# Patient Record
Sex: MALE | Race: BLACK OR AFRICAN AMERICAN | Employment: UNEMPLOYED | URBAN - METROPOLITAN AREA
[De-identification: names, ages, dates, MRNs, and addresses within clinical notes are randomized per-mention and may not be internally consistent; named-entity substitution may affect disease eponyms.]

---

## 2018-11-14 ENCOUNTER — OFFICE VISIT (OUTPATIENT)
Dept: FAMILY MEDICINE CLINIC | Age: 48
End: 2018-11-14
Payer: MEDICARE

## 2018-11-14 VITALS
BODY MASS INDEX: 23.32 KG/M2 | HEIGHT: 67 IN | WEIGHT: 148.6 LBS | SYSTOLIC BLOOD PRESSURE: 135 MMHG | HEART RATE: 76 BPM | DIASTOLIC BLOOD PRESSURE: 74 MMHG | OXYGEN SATURATION: 100 %

## 2018-11-14 DIAGNOSIS — Z13.29 SCREENING FOR THYROID DISORDER: ICD-10-CM

## 2018-11-14 DIAGNOSIS — F71 MR (MENTAL RETARDATION), MODERATE: ICD-10-CM

## 2018-11-14 DIAGNOSIS — R73.9 HYPERGLYCEMIA: ICD-10-CM

## 2018-11-14 DIAGNOSIS — Z23 NEED FOR INFLUENZA VACCINATION: Primary | ICD-10-CM

## 2018-11-14 DIAGNOSIS — Z12.5 SCREENING FOR PROSTATE CANCER: ICD-10-CM

## 2018-11-14 DIAGNOSIS — F63.9 IMPULSE CONTROL DISORDER: ICD-10-CM

## 2018-11-14 DIAGNOSIS — E55.9 VITAMIN D DEFICIENCY: ICD-10-CM

## 2018-11-14 DIAGNOSIS — R01.2 ABNORMAL HEART SOUNDS: ICD-10-CM

## 2018-11-14 DIAGNOSIS — Z13.220 SCREENING, LIPID: ICD-10-CM

## 2018-11-14 DIAGNOSIS — E53.9 VITAMIN B DEFICIENCY: ICD-10-CM

## 2018-11-14 DIAGNOSIS — F95.2 TOURETTE'S SYNDROME: ICD-10-CM

## 2018-11-14 PROCEDURE — 90686 IIV4 VACC NO PRSV 0.5 ML IM: CPT | Performed by: FAMILY MEDICINE

## 2018-11-14 PROCEDURE — 99214 OFFICE O/P EST MOD 30 MIN: CPT | Performed by: FAMILY MEDICINE

## 2018-11-14 PROCEDURE — 1036F TOBACCO NON-USER: CPT | Performed by: FAMILY MEDICINE

## 2018-11-14 PROCEDURE — G8482 FLU IMMUNIZE ORDER/ADMIN: HCPCS | Performed by: FAMILY MEDICINE

## 2018-11-14 PROCEDURE — G0008 ADMIN INFLUENZA VIRUS VAC: HCPCS | Performed by: FAMILY MEDICINE

## 2018-11-14 PROCEDURE — G8420 CALC BMI NORM PARAMETERS: HCPCS | Performed by: FAMILY MEDICINE

## 2018-11-14 PROCEDURE — G8427 DOCREV CUR MEDS BY ELIG CLIN: HCPCS | Performed by: FAMILY MEDICINE

## 2018-11-14 ASSESSMENT — PATIENT HEALTH QUESTIONNAIRE - PHQ9
SUM OF ALL RESPONSES TO PHQ QUESTIONS 1-9: 0
SUM OF ALL RESPONSES TO PHQ QUESTIONS 1-9: 0
2. FEELING DOWN, DEPRESSED OR HOPELESS: 0
1. LITTLE INTEREST OR PLEASURE IN DOING THINGS: 0
SUM OF ALL RESPONSES TO PHQ9 QUESTIONS 1 & 2: 0

## 2018-11-14 ASSESSMENT — ENCOUNTER SYMPTOMS
SORE THROAT: 0
EYE DISCHARGE: 0
EYE PAIN: 0
SHORTNESS OF BREATH: 0
CONSTIPATION: 0
ABDOMINAL PAIN: 0
VOMITING: 0
NAUSEA: 0
VOICE CHANGE: 0
CHEST TIGHTNESS: 0
BACK PAIN: 0
COLOR CHANGE: 0
ABDOMINAL DISTENTION: 0
SINUS PRESSURE: 0
DIARRHEA: 0
RHINORRHEA: 0

## 2018-11-14 NOTE — PROGRESS NOTES
place, and time. He appears well-developed and well-nourished. HENT:   Head: Normocephalic and atraumatic. Right Ear: External ear normal.   Left Ear: External ear normal.   Nose: Nose normal.   Mouth/Throat: Oropharynx is clear and moist. No oropharyngeal exudate. Eyes: Pupils are equal, round, and reactive to light. EOM are normal. Right eye exhibits no discharge. Left eye exhibits no discharge. No scleral icterus. Neck: Normal range of motion. Neck supple. No JVD present. No tracheal deviation present. No thyromegaly present. Cardiovascular: Normal rate, regular rhythm and intact distal pulses. Exam reveals no gallop and no friction rub. Murmur heard. Pulmonary/Chest: No stridor. No respiratory distress. He has no wheezes. He has no rales. He exhibits no tenderness. Abdominal: Soft. Bowel sounds are normal. He exhibits no distension and no mass. There is no tenderness. There is no rebound and no guarding. Musculoskeletal: He exhibits no edema or tenderness. Lymphadenopathy:     He has no cervical adenopathy. Neurological: He is alert and oriented to person, place, and time. He has normal reflexes. He displays normal reflexes. No cranial nerve deficit. He exhibits normal muscle tone. Coordination normal.   MR noted. Has tics and clicks when he speaks   Skin: Skin is warm and dry. No rash noted. No erythema. No pallor. Psychiatric: He has a normal mood and affect. His behavior is normal. Judgment and thought content normal.       Assessment:       Diagnosis Orders   1. Need for influenza vaccination  INFLUENZA, QUADV, 3 YRS AND OLDER, IM, PF, PREFILL SYR OR SDV, 0.5ML (FLUZONE QUADV, PF)   2. MR (mental retardation), moderate     3. Impulse control disorder     4. Tourette's syndrome  Willie Gonzalez MD, Neurology [facility]*   5. Screening, lipid  Comprehensive Metabolic Panel    Lipid Panel   6. Screening for thyroid disorder  T3, Free    T4, Free    TSH without Reflex   7.  Vitamin D deficiency  Vitamin D 25 Hydroxy   8. Vitamin B deficiency  CBC    Folate    Vitamin B12   9. Screening for prostate cancer  Psa screening   10. Hyperglycemia  Hemoglobin A1C         Plan:      Orders Placed This Encounter   Procedures    INFLUENZA, QUADV, 3 YRS AND OLDER, IM, PF, PREFILL SYR OR SDV, 0.5ML (FLUZONE QUADV, PF)    CBC    Comprehensive Metabolic Panel    Folate    Hemoglobin A1C    Lipid Panel    Psa screening    T3, Free    T4, Free    TSH without Reflex    Vitamin B12    Vitamin D 25 Hydroxy   Wally Sandra MD, Neurology [place]*       No outpatient encounter prescriptions on file as of 11/14/2018. No facility-administered encounter medications on file as of 11/14/2018.             Carlene Grider MD

## 2018-11-24 LAB
ALBUMIN SERPL-MCNC: NORMAL G/DL
ALP BLD-CCNC: NORMAL U/L
ALT SERPL-CCNC: NORMAL U/L
ANION GAP SERPL CALCULATED.3IONS-SCNC: NORMAL MMOL/L
AST SERPL-CCNC: NORMAL U/L
AVERAGE GLUCOSE: NORMAL
BASOPHILS ABSOLUTE: NORMAL /ΜL
BASOPHILS RELATIVE PERCENT: NORMAL %
BILIRUB SERPL-MCNC: NORMAL MG/DL (ref 0.1–1.4)
BUN BLDV-MCNC: NORMAL MG/DL
CALCIUM SERPL-MCNC: NORMAL MG/DL
CHLORIDE BLD-SCNC: NORMAL MMOL/L
CHOLESTEROL, TOTAL: 186 MG/DL
CHOLESTEROL/HDL RATIO: 2.4
CO2: NORMAL MMOL/L
CREAT SERPL-MCNC: 0.87 MG/DL
EOSINOPHILS ABSOLUTE: NORMAL /ΜL
EOSINOPHILS RELATIVE PERCENT: NORMAL %
GFR CALCULATED: NORMAL
GLUCOSE BLD-MCNC: NORMAL MG/DL
HBA1C MFR BLD: 5.5 %
HCT VFR BLD CALC: NORMAL % (ref 41–53)
HDLC SERPL-MCNC: 79 MG/DL (ref 35–70)
HEMOGLOBIN: NORMAL G/DL (ref 13.5–17.5)
LDL CHOLESTEROL CALCULATED: 94 MG/DL (ref 0–160)
LYMPHOCYTES ABSOLUTE: NORMAL /ΜL
LYMPHOCYTES RELATIVE PERCENT: NORMAL %
MCH RBC QN AUTO: NORMAL PG
MCHC RBC AUTO-ENTMCNC: NORMAL G/DL
MCV RBC AUTO: NORMAL FL
MONOCYTES ABSOLUTE: NORMAL /ΜL
MONOCYTES RELATIVE PERCENT: NORMAL %
NEUTROPHILS ABSOLUTE: NORMAL /ΜL
NEUTROPHILS RELATIVE PERCENT: NORMAL %
PLATELET # BLD: NORMAL K/ΜL
PMV BLD AUTO: NORMAL FL
POTASSIUM SERPL-SCNC: 4.2 MMOL/L
PROSTATE SPECIFIC ANTIGEN: NORMAL NG/ML
RBC # BLD: NORMAL 10^6/ΜL
SODIUM BLD-SCNC: NORMAL MMOL/L
T3 FREE: NORMAL
T4 FREE: NORMAL
TOTAL PROTEIN: NORMAL
TRIGL SERPL-MCNC: 44 MG/DL
TSH SERPL DL<=0.05 MIU/L-ACNC: NORMAL UIU/ML
VITAMIN B-12: NORMAL
VITAMIN D 25-HYDROXY: NORMAL
VITAMIN D2, 25 HYDROXY: NORMAL
VITAMIN D3,25 HYDROXY: NORMAL
VLDLC SERPL CALC-MCNC: ABNORMAL MG/DL
WBC # BLD: NORMAL 10^3/ML

## 2018-11-27 DIAGNOSIS — R73.9 HYPERGLYCEMIA: ICD-10-CM

## 2018-11-27 DIAGNOSIS — Z13.220 SCREENING, LIPID: ICD-10-CM

## 2018-11-27 DIAGNOSIS — E55.9 VITAMIN D DEFICIENCY: ICD-10-CM

## 2018-11-27 DIAGNOSIS — E53.9 VITAMIN B DEFICIENCY: ICD-10-CM

## 2018-11-27 DIAGNOSIS — Z12.5 SCREENING FOR PROSTATE CANCER: ICD-10-CM

## 2018-11-27 DIAGNOSIS — Z13.29 SCREENING FOR THYROID DISORDER: ICD-10-CM

## 2018-11-30 ENCOUNTER — HOSPITAL ENCOUNTER (EMERGENCY)
Age: 48
Discharge: HOME | End: 2018-11-30
Attending: EMERGENCY MEDICINE
Payer: MEDICARE

## 2018-11-30 VITALS
HEART RATE: 73 BPM | BODY MASS INDEX: 23.36 KG/M2 | WEIGHT: 145.38 LBS | TEMPERATURE: 97.5 F | SYSTOLIC BLOOD PRESSURE: 132 MMHG | OXYGEN SATURATION: 98 % | DIASTOLIC BLOOD PRESSURE: 74 MMHG | HEIGHT: 66 IN | RESPIRATION RATE: 18 BRPM

## 2018-11-30 DIAGNOSIS — S76.211A GROIN STRAIN, RIGHT, INITIAL ENCOUNTER: Primary | ICD-10-CM

## 2018-11-30 PROCEDURE — 99282 EMERGENCY DEPT VISIT SF MDM: CPT

## 2018-11-30 RX ORDER — IBUPROFEN 800 MG/1
800 TABLET ORAL EVERY 8 HOURS PRN
Qty: 20 TABLET | Refills: 0 | Status: SHIPPED | OUTPATIENT
Start: 2018-11-30 | End: 2019-06-26 | Stop reason: CLARIF

## 2018-11-30 RX ORDER — IBUPROFEN 800 MG/1
800 TABLET ORAL ONCE
Status: DISCONTINUED | OUTPATIENT
Start: 2018-11-30 | End: 2018-11-30 | Stop reason: HOSPADM

## 2018-11-30 ASSESSMENT — PAIN DESCRIPTION - ORIENTATION: ORIENTATION: RIGHT

## 2018-11-30 ASSESSMENT — PAIN SCALES - GENERAL: PAINLEVEL_OUTOF10: 5

## 2018-11-30 ASSESSMENT — PAIN DESCRIPTION - LOCATION: LOCATION: HIP

## 2018-11-30 ASSESSMENT — PAIN SCALES - WONG BAKER: WONGBAKER_NUMERICALRESPONSE: 4

## 2018-11-30 NOTE — ED PROVIDER NOTES
[address] [place] [address] ED  eMERGENCY dEPARTMENT eNCOUnter      Pt Name: Donald Rutherford  MRN: [id number]  [place] 1970  Date of evaluation: 11/30/2018  Provider: Harsh Brunson       Chief Complaint   Patient presents with    Hip Pain         HISTORY Madelyn  (Location/Symptom, Timing/Onset, Context/Setting, Quality, Duration, Modifying Factors, Severity.)   Donald Rutherford is a 51 y.o. male who presents to the emergency department By private auto for evaluation of right groin pain that happened when he was at work today. He states he was lifting a garbage can up and felt a pull in his right groin area. Pain is a 5 out of 10 and is aggravated with walking. Nursing Notes were reviewed. PASTMEDICAL HISTORY     Past Medical History:   Diagnosis Date    Heart murmur     Impulse control disorder     MR (mental retardation), moderate     Tourette syndrome          SURGICAL HISTORY     History reviewed. No pertinent surgical history. CURRENT MEDICATIONS     Discharge Medication List as of 11/30/2018  6:11 PM          ALLERGIES     Patient has no known allergies. FAMILY HISTORY       Family History   Problem Relation Age of Onset    Breast Cancer Mother           SOCIAL HISTORY       Social History     Social History    Marital status: Single     Spouse name: N/A    Number of children: N/A    Years of education: N/A     Social History Main Topics    Smoking status: Never Smoker    Smokeless tobacco: Never Used    Alcohol use No    Drug use: No    Sexual activity: No     Other Topics Concern    None     Social History Narrative    None         REVIEW OF SYSTEMS    (2-9 systems for level 4, 10 or more for level 5)     Review of Systems   Musculoskeletal: Positive for myalgias. All other systems reviewed and are negative. Except as noted above the remainder of the review of systems was reviewed and negative.      PHYSICAL EXAM    (up to 7 for level 4, 8 or

## 2018-12-12 ENCOUNTER — OFFICE VISIT (OUTPATIENT)
Dept: FAMILY MEDICINE CLINIC | Age: 48
End: 2018-12-12
Payer: MEDICARE

## 2018-12-12 VITALS
SYSTOLIC BLOOD PRESSURE: 125 MMHG | DIASTOLIC BLOOD PRESSURE: 81 MMHG | BODY MASS INDEX: 24.08 KG/M2 | HEART RATE: 72 BPM | WEIGHT: 149.8 LBS | HEIGHT: 66 IN | OXYGEN SATURATION: 100 %

## 2018-12-12 DIAGNOSIS — F95.8 MOTOR TIC DISORDER: ICD-10-CM

## 2018-12-12 DIAGNOSIS — F95.2 TOURETTE'S SYNDROME: ICD-10-CM

## 2018-12-12 DIAGNOSIS — E55.9 VITAMIN D DEFICIENCY: ICD-10-CM

## 2018-12-12 DIAGNOSIS — Z00.00 ROUTINE GENERAL MEDICAL EXAMINATION AT A HEALTH CARE FACILITY: Primary | ICD-10-CM

## 2018-12-12 PROCEDURE — G8482 FLU IMMUNIZE ORDER/ADMIN: HCPCS | Performed by: FAMILY MEDICINE

## 2018-12-12 PROCEDURE — G0439 PPPS, SUBSEQ VISIT: HCPCS | Performed by: FAMILY MEDICINE

## 2018-12-12 RX ORDER — ERGOCALCIFEROL 1.25 MG/1
50000 CAPSULE ORAL WEEKLY
Qty: 12 CAPSULE | Refills: 0 | Status: SHIPPED | OUTPATIENT
Start: 2018-12-12 | End: 2019-06-26 | Stop reason: CLARIF

## 2018-12-12 ASSESSMENT — PATIENT HEALTH QUESTIONNAIRE - PHQ9
SUM OF ALL RESPONSES TO PHQ QUESTIONS 1-9: 0
SUM OF ALL RESPONSES TO PHQ QUESTIONS 1-9: 0

## 2018-12-12 ASSESSMENT — ANXIETY QUESTIONNAIRES: GAD7 TOTAL SCORE: 4

## 2018-12-12 ASSESSMENT — LIFESTYLE VARIABLES: HOW OFTEN DO YOU HAVE A DRINK CONTAINING ALCOHOL: 0

## 2019-01-01 ENCOUNTER — TELEPHONE (OUTPATIENT)
Dept: FAMILY MEDICINE CLINIC | Age: 49
End: 2019-01-01

## 2019-01-01 ENCOUNTER — OFFICE VISIT (OUTPATIENT)
Dept: FAMILY MEDICINE CLINIC | Age: 49
End: 2019-01-01
Payer: MEDICARE

## 2019-01-01 ENCOUNTER — OFFICE VISIT (OUTPATIENT)
Dept: NEUROLOGY | Age: 49
End: 2019-01-01
Payer: MEDICARE

## 2019-01-01 ENCOUNTER — HOSPITAL ENCOUNTER (OUTPATIENT)
Dept: MRI IMAGING | Age: 49
Discharge: HOME | End: 2019-11-14
Payer: MEDICARE

## 2019-01-01 VITALS
WEIGHT: 152.2 LBS | SYSTOLIC BLOOD PRESSURE: 108 MMHG | BODY MASS INDEX: 23.89 KG/M2 | DIASTOLIC BLOOD PRESSURE: 66 MMHG | OXYGEN SATURATION: 98 % | HEART RATE: 66 BPM | HEIGHT: 67 IN

## 2019-01-01 VITALS
HEIGHT: 67 IN | WEIGHT: 149.6 LBS | SYSTOLIC BLOOD PRESSURE: 108 MMHG | HEART RATE: 73 BPM | DIASTOLIC BLOOD PRESSURE: 71 MMHG | OXYGEN SATURATION: 99 % | BODY MASS INDEX: 23.48 KG/M2

## 2019-01-01 VITALS
WEIGHT: 149.6 LBS | HEIGHT: 67 IN | DIASTOLIC BLOOD PRESSURE: 71 MMHG | HEART RATE: 72 BPM | SYSTOLIC BLOOD PRESSURE: 117 MMHG | BODY MASS INDEX: 23.48 KG/M2

## 2019-01-01 DIAGNOSIS — F63.9 IMPULSE CONTROL DISORDER: ICD-10-CM

## 2019-01-01 DIAGNOSIS — Z12.11 SCREEN FOR COLON CANCER: Primary | ICD-10-CM

## 2019-01-01 DIAGNOSIS — Z13.220 SCREENING, LIPID: ICD-10-CM

## 2019-01-01 DIAGNOSIS — Z13.29 SCREENING FOR THYROID DISORDER: ICD-10-CM

## 2019-01-01 DIAGNOSIS — G40.209 PARTIAL SYMPTOMATIC EPILEPSY WITH COMPLEX PARTIAL SEIZURES, NOT INTRACTABLE, WITHOUT STATUS EPILEPTICUS (HCC): Primary | ICD-10-CM

## 2019-01-01 DIAGNOSIS — K40.90 UNILATERAL INGUINAL HERNIA WITHOUT OBSTRUCTION OR GANGRENE, RECURRENCE NOT SPECIFIED: ICD-10-CM

## 2019-01-01 DIAGNOSIS — E53.9 VITAMIN B DEFICIENCY: ICD-10-CM

## 2019-01-01 DIAGNOSIS — E55.9 VITAMIN D DEFICIENCY: ICD-10-CM

## 2019-01-01 DIAGNOSIS — Z12.11 COLON CANCER SCREENING: ICD-10-CM

## 2019-01-01 DIAGNOSIS — G40.209 PARTIAL SYMPTOMATIC EPILEPSY WITH COMPLEX PARTIAL SEIZURES, NOT INTRACTABLE, WITHOUT STATUS EPILEPTICUS (HCC): ICD-10-CM

## 2019-01-01 DIAGNOSIS — Z23 NEED FOR INFLUENZA VACCINATION: Primary | ICD-10-CM

## 2019-01-01 DIAGNOSIS — R73.9 HYPERGLYCEMIA: ICD-10-CM

## 2019-01-01 DIAGNOSIS — G93.81 MESIAL TEMPORAL SCLEROSIS: ICD-10-CM

## 2019-01-01 DIAGNOSIS — Z13.1 SCREENING FOR DIABETES MELLITUS: ICD-10-CM

## 2019-01-01 DIAGNOSIS — Z12.5 SCREENING FOR MALIGNANT NEOPLASM OF PROSTATE: ICD-10-CM

## 2019-01-01 DIAGNOSIS — Z00.00 ROUTINE GENERAL MEDICAL EXAMINATION AT A HEALTH CARE FACILITY: ICD-10-CM

## 2019-01-01 DIAGNOSIS — F71 MODERATE INTELLECTUAL DISABILITY: ICD-10-CM

## 2019-01-01 DIAGNOSIS — F95.2 TOURETTE'S SYNDROME: ICD-10-CM

## 2019-01-01 DIAGNOSIS — K40.90 NON-RECURRENT UNILATERAL INGUINAL HERNIA WITHOUT OBSTRUCTION OR GANGRENE: ICD-10-CM

## 2019-01-01 DIAGNOSIS — F95.2 TOURETTE SYNDROME: ICD-10-CM

## 2019-01-01 LAB
ALBUMIN SERPL-MCNC: 4.2 G/DL
ALP BLD-CCNC: 91 U/L
ALT SERPL-CCNC: 20 U/L
ANION GAP SERPL CALCULATED.3IONS-SCNC: NORMAL MMOL/L
AST SERPL-CCNC: 24 U/L
AVERAGE GLUCOSE: 111
BASOPHILS ABSOLUTE: 0.1 /ΜL
BASOPHILS RELATIVE PERCENT: 0.7 %
BILIRUB SERPL-MCNC: 0.6 MG/DL (ref 0.1–1.4)
BUN BLDV-MCNC: NORMAL MG/DL
CALCIUM SERPL-MCNC: 9.7 MG/DL
CHLORIDE BLD-SCNC: 107 MMOL/L
CHOLESTEROL, TOTAL: 163 MG/DL
CHOLESTEROL/HDL RATIO: 2.5
CO2: NORMAL
CREAT SERPL-MCNC: 1.04 MG/DL
EOSINOPHILS ABSOLUTE: 0.1 /ΜL
EOSINOPHILS RELATIVE PERCENT: 0.9 %
GFR CALCULATED: <60
GLUCOSE BLD-MCNC: 90 MG/DL
HBA1C MFR BLD: 5.5 %
HCT VFR BLD CALC: 46.7 % (ref 41–53)
HDLC SERPL-MCNC: 64 MG/DL (ref 35–70)
HEMOGLOBIN: 15.5 G/DL (ref 13.5–17.5)
LDL CHOLESTEROL CALCULATED: 89 MG/DL (ref 0–160)
LYMPHOCYTES ABSOLUTE: 1.8 /ΜL
LYMPHOCYTES RELATIVE PERCENT: 22.7 %
MCH RBC QN AUTO: NORMAL PG
MCHC RBC AUTO-ENTMCNC: NORMAL G/DL
MCV RBC AUTO: NORMAL FL
MONOCYTES ABSOLUTE: 0.6 /ΜL
MONOCYTES RELATIVE PERCENT: 7.4 %
NEUTROPHILS ABSOLUTE: 5.3 /ΜL
NEUTROPHILS RELATIVE PERCENT: 68.3 %
PLATELET # BLD: 195 K/ΜL
PMV BLD AUTO: NORMAL FL
POTASSIUM SERPL-SCNC: 4.6 MMOL/L
PROSTATE SPECIFIC ANTIGEN: 1.81 NG/ML
RBC # BLD: 5.47 10^6/ΜL
SODIUM BLD-SCNC: 144 MMOL/L
T3 FREE: NORMAL
T4 FREE: NORMAL
TOTAL PROTEIN: 8.2
TRIGL SERPL-MCNC: 50 MG/DL
TSH SERPL DL<=0.05 MIU/L-ACNC: 0.52 UIU/ML
VITAMIN B-12: NORMAL
VITAMIN D 25-HYDROXY: NORMAL
VITAMIN D2, 25 HYDROXY: NORMAL
VITAMIN D3,25 HYDROXY: NORMAL
VLDLC SERPL CALC-MCNC: 10 MG/DL
WBC # BLD: 7.8 10^3/ML

## 2019-01-01 PROCEDURE — 1036F TOBACCO NON-USER: CPT | Performed by: NURSE PRACTITIONER

## 2019-01-01 PROCEDURE — G8482 FLU IMMUNIZE ORDER/ADMIN: HCPCS | Performed by: FAMILY MEDICINE

## 2019-01-01 PROCEDURE — G8420 CALC BMI NORM PARAMETERS: HCPCS | Performed by: FAMILY MEDICINE

## 2019-01-01 PROCEDURE — G8427 DOCREV CUR MEDS BY ELIG CLIN: HCPCS | Performed by: FAMILY MEDICINE

## 2019-01-01 PROCEDURE — G8420 CALC BMI NORM PARAMETERS: HCPCS | Performed by: NURSE PRACTITIONER

## 2019-01-01 PROCEDURE — 3017F COLORECTAL CA SCREEN DOC REV: CPT | Performed by: FAMILY MEDICINE

## 2019-01-01 PROCEDURE — 3017F COLORECTAL CA SCREEN DOC REV: CPT | Performed by: NURSE PRACTITIONER

## 2019-01-01 PROCEDURE — G0008 ADMIN INFLUENZA VIRUS VAC: HCPCS | Performed by: FAMILY MEDICINE

## 2019-01-01 PROCEDURE — 6360000004 HC RX CONTRAST MEDICATION: Performed by: NURSE PRACTITIONER

## 2019-01-01 PROCEDURE — G8427 DOCREV CUR MEDS BY ELIG CLIN: HCPCS | Performed by: NURSE PRACTITIONER

## 2019-01-01 PROCEDURE — G8484 FLU IMMUNIZE NO ADMIN: HCPCS | Performed by: NURSE PRACTITIONER

## 2019-01-01 PROCEDURE — G0439 PPPS, SUBSEQ VISIT: HCPCS | Performed by: FAMILY MEDICINE

## 2019-01-01 PROCEDURE — 70553 MRI BRAIN STEM W/O & W/DYE: CPT

## 2019-01-01 PROCEDURE — 90686 IIV4 VACC NO PRSV 0.5 ML IM: CPT | Performed by: FAMILY MEDICINE

## 2019-01-01 PROCEDURE — 1036F TOBACCO NON-USER: CPT | Performed by: FAMILY MEDICINE

## 2019-01-01 PROCEDURE — A9579 GAD-BASE MR CONTRAST NOS,1ML: HCPCS | Performed by: NURSE PRACTITIONER

## 2019-01-01 PROCEDURE — 99214 OFFICE O/P EST MOD 30 MIN: CPT | Performed by: NURSE PRACTITIONER

## 2019-01-01 PROCEDURE — 99214 OFFICE O/P EST MOD 30 MIN: CPT | Performed by: FAMILY MEDICINE

## 2019-01-01 RX ORDER — MULTIVIT-MIN/IRON/FOLIC ACID/K 18-600-40
1 CAPSULE ORAL DAILY
Qty: 90 CAPSULE | Refills: 5 | Status: SHIPPED | OUTPATIENT
Start: 2019-01-01

## 2019-01-01 RX ORDER — TOPIRAMATE 100 MG/1
TABLET, FILM COATED ORAL
Qty: 60 TABLET | Refills: 0 | Status: SHIPPED | OUTPATIENT
Start: 2019-01-01 | End: 2020-01-01

## 2019-01-01 RX ORDER — TOPIRAMATE 100 MG/1
TABLET, FILM COATED ORAL
Qty: 60 TABLET | Refills: 0 | Status: SHIPPED | OUTPATIENT
Start: 2019-01-01 | End: 2019-01-01

## 2019-01-01 RX ADMIN — GADOTERIDOL 15 ML: 279.3 INJECTION, SOLUTION INTRAVENOUS at 14:30

## 2019-01-01 ASSESSMENT — PATIENT HEALTH QUESTIONNAIRE - PHQ9
SUM OF ALL RESPONSES TO PHQ9 QUESTIONS 1 & 2: 0
SUM OF ALL RESPONSES TO PHQ9 QUESTIONS 1 & 2: 0
2. FEELING DOWN, DEPRESSED OR HOPELESS: 0
2. FEELING DOWN, DEPRESSED OR HOPELESS: 0
SUM OF ALL RESPONSES TO PHQ QUESTIONS 1-9: 0
SUM OF ALL RESPONSES TO PHQ QUESTIONS 1-9: 0
1. LITTLE INTEREST OR PLEASURE IN DOING THINGS: 0
SUM OF ALL RESPONSES TO PHQ QUESTIONS 1-9: 0
1. LITTLE INTEREST OR PLEASURE IN DOING THINGS: 0
SUM OF ALL RESPONSES TO PHQ QUESTIONS 1-9: 0

## 2019-01-01 ASSESSMENT — ENCOUNTER SYMPTOMS
COLOR CHANGE: 0
ABDOMINAL DISTENTION: 0
EYE DISCHARGE: 0
VOICE CHANGE: 0
DIARRHEA: 0
SORE THROAT: 0
RHINORRHEA: 0
CHEST TIGHTNESS: 0
EYE PAIN: 0
ABDOMINAL PAIN: 0
CONSTIPATION: 0
SINUS PRESSURE: 0
BACK PAIN: 0
NAUSEA: 0
SHORTNESS OF BREATH: 0
VOMITING: 0

## 2019-01-01 ASSESSMENT — LIFESTYLE VARIABLES: HOW OFTEN DO YOU HAVE A DRINK CONTAINING ALCOHOL: 0

## 2019-02-28 ENCOUNTER — HOSPITAL ENCOUNTER (OUTPATIENT)
Dept: NON INVASIVE DIAGNOSTICS | Age: 49
Discharge: HOME | End: 2019-02-28
Payer: MEDICARE

## 2019-02-28 LAB
LV EF: 65 %
LVEF MODALITY: NORMAL

## 2019-02-28 PROCEDURE — 93306 TTE W/DOPPLER COMPLETE: CPT

## 2019-06-26 ENCOUNTER — OFFICE VISIT (OUTPATIENT)
Dept: FAMILY MEDICINE CLINIC | Age: 49
End: 2019-06-26
Payer: MEDICARE

## 2019-06-26 VITALS
DIASTOLIC BLOOD PRESSURE: 74 MMHG | OXYGEN SATURATION: 97 % | WEIGHT: 149.2 LBS | BODY MASS INDEX: 23.42 KG/M2 | SYSTOLIC BLOOD PRESSURE: 119 MMHG | HEART RATE: 75 BPM | HEIGHT: 67 IN

## 2019-06-26 DIAGNOSIS — Z12.5 SCREENING FOR MALIGNANT NEOPLASM OF PROSTATE: ICD-10-CM

## 2019-06-26 DIAGNOSIS — Z12.11 SCREEN FOR COLON CANCER: ICD-10-CM

## 2019-06-26 DIAGNOSIS — F71 MODERATE INTELLECTUAL DISABILITY: ICD-10-CM

## 2019-06-26 DIAGNOSIS — F63.9 IMPULSE CONTROL DISORDER: ICD-10-CM

## 2019-06-26 DIAGNOSIS — E55.9 VITAMIN D DEFICIENCY: ICD-10-CM

## 2019-06-26 DIAGNOSIS — Z13.29 SCREENING FOR THYROID DISORDER: ICD-10-CM

## 2019-06-26 DIAGNOSIS — Z86.69 HISTORY OF ABSENCE SEIZURES: ICD-10-CM

## 2019-06-26 DIAGNOSIS — F95.2 TOURETTE'S SYNDROME: Primary | ICD-10-CM

## 2019-06-26 PROCEDURE — G8420 CALC BMI NORM PARAMETERS: HCPCS | Performed by: FAMILY MEDICINE

## 2019-06-26 PROCEDURE — 3017F COLORECTAL CA SCREEN DOC REV: CPT | Performed by: FAMILY MEDICINE

## 2019-06-26 PROCEDURE — 1036F TOBACCO NON-USER: CPT | Performed by: FAMILY MEDICINE

## 2019-06-26 PROCEDURE — 99214 OFFICE O/P EST MOD 30 MIN: CPT | Performed by: FAMILY MEDICINE

## 2019-06-26 PROCEDURE — G8427 DOCREV CUR MEDS BY ELIG CLIN: HCPCS | Performed by: FAMILY MEDICINE

## 2019-06-26 ASSESSMENT — ENCOUNTER SYMPTOMS
COLOR CHANGE: 0
ABDOMINAL DISTENTION: 0
BACK PAIN: 0
DIARRHEA: 0
RHINORRHEA: 0
SORE THROAT: 0
VOICE CHANGE: 0
CHEST TIGHTNESS: 0
CONSTIPATION: 0
SHORTNESS OF BREATH: 0
EYE DISCHARGE: 0
EYE PAIN: 0
SINUS PRESSURE: 0
ABDOMINAL PAIN: 0
VOMITING: 0
NAUSEA: 0

## 2019-06-26 ASSESSMENT — PATIENT HEALTH QUESTIONNAIRE - PHQ9
SUM OF ALL RESPONSES TO PHQ QUESTIONS 1-9: 0
SUM OF ALL RESPONSES TO PHQ9 QUESTIONS 1 & 2: 0
2. FEELING DOWN, DEPRESSED OR HOPELESS: 0
SUM OF ALL RESPONSES TO PHQ QUESTIONS 1-9: 0
1. LITTLE INTEREST OR PLEASURE IN DOING THINGS: 0

## 2019-06-26 NOTE — PROGRESS NOTES
present. Cardiovascular: Normal rate, regular rhythm and intact distal pulses. Exam reveals no gallop and no friction rub. No murmur heard. Pulmonary/Chest: No stridor. No respiratory distress. He has no wheezes. He has no rales. He exhibits no tenderness. Abdominal: Soft. Bowel sounds are normal. He exhibits no distension. There is no tenderness. There is no rebound. Musculoskeletal: Normal range of motion. Lymphadenopathy:     He has no cervical adenopathy. Neurological: He is alert and oriented to person, place, and time. He displays normal reflexes. No cranial nerve deficit. He exhibits normal muscle tone. Repetitive movements face due to tourettes   Skin: Skin is warm. No rash noted. No erythema. No pallor. Assessment:       Diagnosis Orders   1. Tourette's syndrome  Vickie Peres MD, Neu[facility], [facility]   2. MR (mental retardation), moderate     3. Impulse control disorder     4. Vitamin D deficiency  Vitamin D 25 Hydroxy   5. History of absence seizures  Vickie Peres MD, Neurology, [address]   6. Screen for colon cancer  Tangela Dowling MD, Gastroenterology, [address]   7. Screening for malignant neoplasm of prostate  Psa screening   8. Screening for thyroid disorder  TSH without Reflex         Plan:      Orders Placed This Encounter   Procedures    Psa screening    TSH without Reflex    Vitamin D 25 Hydroxy   Vickie Peres MD, Neurology, Manasa Trinidad MD, Gastroenterology, [address]       Outpatient Encounter Medications as of 6/26/2019   Medication Sig Dispense Refill    [DISCONTINUED] vitamin D (ERGOCALCIFEROL) 32415 units CAPS capsule Take 1 capsule by mouth once a week 12 capsule 0    [DISCONTINUED] ibuprofen (ADVIL;MOTRIN) 800 MG tablet Take 1 tablet by mouth every 8 hours as needed for Pain 20 tablet 0     No facility-administered encounter medications on file as of 6/26/2019.             LEVI MAC Deborah Cheng MD

## 2019-06-27 ENCOUNTER — TELEPHONE (OUTPATIENT)
Dept: GASTROENTEROLOGY | Age: 49
End: 2019-06-27

## 2019-07-08 NOTE — TELEPHONE ENCOUNTER
Tried to contact patient to schedule colonoscopy, lady who answered the phone said we had a wrong number  Not able to reach-

## 2019-07-18 ENCOUNTER — OFFICE VISIT (OUTPATIENT)
Dept: NEUROLOGY | Age: 49
End: 2019-07-18
Payer: MEDICARE

## 2019-07-18 VITALS
WEIGHT: 150 LBS | BODY MASS INDEX: 23.54 KG/M2 | DIASTOLIC BLOOD PRESSURE: 84 MMHG | HEIGHT: 67 IN | HEART RATE: 64 BPM | SYSTOLIC BLOOD PRESSURE: 128 MMHG

## 2019-07-18 DIAGNOSIS — G40.209 PARTIAL SYMPTOMATIC EPILEPSY WITH COMPLEX PARTIAL SEIZURES, NOT INTRACTABLE, WITHOUT STATUS EPILEPTICUS (HCC): Primary | ICD-10-CM

## 2019-07-18 DIAGNOSIS — F95.2 TOURETTE'S SYNDROME: ICD-10-CM

## 2019-07-18 DIAGNOSIS — F71 MODERATE INTELLECTUAL DISABILITY: ICD-10-CM

## 2019-07-18 PROCEDURE — 99204 OFFICE O/P NEW MOD 45 MIN: CPT | Performed by: NURSE PRACTITIONER

## 2019-07-18 PROCEDURE — G8420 CALC BMI NORM PARAMETERS: HCPCS | Performed by: NURSE PRACTITIONER

## 2019-07-18 PROCEDURE — G8427 DOCREV CUR MEDS BY ELIG CLIN: HCPCS | Performed by: NURSE PRACTITIONER

## 2019-07-18 PROCEDURE — 3017F COLORECTAL CA SCREEN DOC REV: CPT | Performed by: NURSE PRACTITIONER

## 2019-07-18 PROCEDURE — 1036F TOBACCO NON-USER: CPT | Performed by: NURSE PRACTITIONER

## 2019-07-18 RX ORDER — TOPIRAMATE 100 MG/1
100 TABLET, FILM COATED ORAL 2 TIMES DAILY
Qty: 60 TABLET | Refills: 3 | Status: SHIPPED | OUTPATIENT
Start: 2019-07-18 | End: 2019-01-01 | Stop reason: SDUPTHER

## 2019-07-18 RX ORDER — TOPIRAMATE 50 MG/1
TABLET, FILM COATED ORAL
Qty: 42 TABLET | Refills: 0 | Status: SHIPPED | OUTPATIENT
Start: 2019-07-18

## 2019-08-13 ENCOUNTER — HOSPITAL ENCOUNTER (OUTPATIENT)
Dept: NEUROLOGY | Age: 49
Discharge: HOME | End: 2019-08-13
Payer: MEDICARE

## 2019-08-13 ENCOUNTER — HOSPITAL ENCOUNTER (OUTPATIENT)
Dept: CT IMAGING | Age: 49
Discharge: HOME | End: 2019-08-15
Payer: MEDICARE

## 2019-08-13 DIAGNOSIS — G40.209 PARTIAL SYMPTOMATIC EPILEPSY WITH COMPLEX PARTIAL SEIZURES, NOT INTRACTABLE, WITHOUT STATUS EPILEPTICUS (HCC): ICD-10-CM

## 2019-08-13 PROCEDURE — 70450 CT HEAD/BRAIN W/O DYE: CPT

## 2019-08-13 PROCEDURE — 95816 EEG AWAKE AND DROWSY: CPT

## 2019-08-13 PROCEDURE — 95816 EEG AWAKE AND DROWSY: CPT | Performed by: PSYCHIATRY & NEUROLOGY

## 2019-08-17 NOTE — PROCEDURES
[id number] [facility] [address]                [address] [place] [address]                          ELECTROENCEPHALOGRAM REPORT    PATIENT NAME: Roman Vega                     :        1970  MED REC NO:   [id number]                             ROOM:  ACCOUNT NO:   [de-identified]                           ADMIT DATE: 2019  PROVIDER:     Maria A Gonsalves    DATE OF EE2019    HISTORY:  This is a 24-year-old male with episodes of staring. He is  being evaluated for possible seizures. He also has mental retardation  and Tourette's syndrome. DESCRIPTION OF THE PROCEDURE:  Electrodes were applied using paste in  positions dictated by the International 10-20 system of placement. Reviewing montages included both referential and bipolar derivations. In addition to EEG data, EKG and eye movements were recorded. This is a  routine recording. This test was performed on 2019. DESCRIPTION OF ACTIVITIES:  At the onset of the study, the patient is  awake and during wakefulness, there are continuous runs of 14-15 Hz  20-30 microvolts posterior dominant alpha rhythm, which attenuated  symmetrically with eye opening. Low voltage high frequency beta  activity noted in bilateral anterior head regions. At times, rhythmic  mid temporal theta discharges are noted. Eye movement artifacts are  also noted. EKG montage revealed artifacts. Sleep is not recorded. Photic stimulation did not induce posterior driving responses. Hyperventilation is not performed. ELECTRODIAGNOSTIC INTERPRETATION:  This EEG performed during drowsiness  did not demonstrate any ongoing electrographic seizure activity. No  epileptiform discharges noted. No electrographic seizures noted. EKG  montage revealed artifacts. Clinical correlation is recommended.         [employer]    D: 2019 20:45:09       T: 2019 20:54:36     [id number]  Job#: [id number]     Doc#: [id number]    CC:

## 2019-10-28 PROBLEM — G93.81 MESIAL TEMPORAL SCLEROSIS: Status: ACTIVE | Noted: 2019-01-01

## 2019-10-31 PROBLEM — K40.90 NON-RECURRENT UNILATERAL INGUINAL HERNIA: Status: ACTIVE | Noted: 2019-01-01

## 2020-01-01 ENCOUNTER — ANESTHESIA (OUTPATIENT)
Dept: OPERATING ROOM | Age: 50
DRG: 350 | End: 2020-01-01
Payer: MEDICARE

## 2020-01-01 ENCOUNTER — HOSPITAL ENCOUNTER (OUTPATIENT)
Facility: CLINIC | Age: 50
Discharge: HOME | DRG: 350 | End: 2020-08-29
Payer: MEDICARE

## 2020-01-01 ENCOUNTER — OFFICE VISIT (OUTPATIENT)
Dept: FAMILY MEDICINE CLINIC | Age: 50
End: 2020-01-01
Payer: MEDICARE

## 2020-01-01 ENCOUNTER — APPOINTMENT (OUTPATIENT)
Dept: CT IMAGING | Age: 50
DRG: 350 | End: 2020-01-01
Payer: MEDICARE

## 2020-01-01 ENCOUNTER — APPOINTMENT (OUTPATIENT)
Dept: GENERAL RADIOLOGY | Age: 50
DRG: 350 | End: 2020-01-01
Payer: MEDICARE

## 2020-01-01 ENCOUNTER — HOSPITAL ENCOUNTER (INPATIENT)
Age: 50
LOS: 6 days | DRG: 350 | End: 2020-09-04
Attending: EMERGENCY MEDICINE | Admitting: FAMILY MEDICINE
Payer: MEDICARE

## 2020-01-01 ENCOUNTER — HOSPITAL ENCOUNTER (OUTPATIENT)
Dept: GENERAL RADIOLOGY | Facility: CLINIC | Age: 50
Discharge: HOME | DRG: 350 | End: 2020-08-29
Payer: MEDICARE

## 2020-01-01 ENCOUNTER — HOSPITAL ENCOUNTER (OUTPATIENT)
Facility: CLINIC | Age: 50
Discharge: HOME | DRG: 350 | End: 2020-08-28
Payer: MEDICARE

## 2020-01-01 ENCOUNTER — OFFICE VISIT (OUTPATIENT)
Dept: NEUROLOGY | Age: 50
End: 2020-01-01
Payer: MEDICARE

## 2020-01-01 ENCOUNTER — APPOINTMENT (OUTPATIENT)
Dept: INTERVENTIONAL RADIOLOGY/VASCULAR | Age: 50
DRG: 350 | End: 2020-01-01
Payer: MEDICARE

## 2020-01-01 ENCOUNTER — ANESTHESIA EVENT (OUTPATIENT)
Dept: OPERATING ROOM | Age: 50
DRG: 350 | End: 2020-01-01
Payer: MEDICARE

## 2020-01-01 ENCOUNTER — TELEPHONE (OUTPATIENT)
Dept: FAMILY MEDICINE CLINIC | Age: 50
End: 2020-01-01

## 2020-01-01 VITALS
OXYGEN SATURATION: 99 % | DIASTOLIC BLOOD PRESSURE: 70 MMHG | HEART RATE: 77 BPM | WEIGHT: 145 LBS | HEIGHT: 67 IN | BODY MASS INDEX: 22.76 KG/M2 | SYSTOLIC BLOOD PRESSURE: 108 MMHG

## 2020-01-01 VITALS
DIASTOLIC BLOOD PRESSURE: 75 MMHG | OXYGEN SATURATION: 94 % | HEIGHT: 67 IN | HEART RATE: 90 BPM | TEMPERATURE: 97.7 F | SYSTOLIC BLOOD PRESSURE: 117 MMHG | BODY MASS INDEX: 24.17 KG/M2 | WEIGHT: 154 LBS

## 2020-01-01 VITALS
SYSTOLIC BLOOD PRESSURE: 77 MMHG | WEIGHT: 180 LBS | TEMPERATURE: 98 F | OXYGEN SATURATION: 38 % | BODY MASS INDEX: 28.25 KG/M2 | HEART RATE: 105 BPM | HEIGHT: 67 IN | DIASTOLIC BLOOD PRESSURE: 64 MMHG

## 2020-01-01 VITALS — SYSTOLIC BLOOD PRESSURE: 104 MMHG | OXYGEN SATURATION: 100 % | DIASTOLIC BLOOD PRESSURE: 61 MMHG

## 2020-01-01 VITALS
HEIGHT: 67 IN | SYSTOLIC BLOOD PRESSURE: 120 MMHG | WEIGHT: 143.6 LBS | BODY MASS INDEX: 22.54 KG/M2 | DIASTOLIC BLOOD PRESSURE: 75 MMHG | HEART RATE: 99 BPM

## 2020-01-01 VITALS
WEIGHT: 160.2 LBS | BODY MASS INDEX: 25.15 KG/M2 | DIASTOLIC BLOOD PRESSURE: 74 MMHG | HEART RATE: 79 BPM | OXYGEN SATURATION: 97 % | HEIGHT: 67 IN | SYSTOLIC BLOOD PRESSURE: 123 MMHG | TEMPERATURE: 98.2 F

## 2020-01-01 LAB
-: NORMAL
ABSOLUTE EOS #: 0 K/UL (ref 0–0.4)
ABSOLUTE EOS #: 0 K/UL (ref 0–0.4)
ABSOLUTE EOS #: 0 K/UL (ref 0–0.44)
ABSOLUTE EOS #: 0.07 K/UL (ref 0–0.44)
ABSOLUTE EOS #: 0.08 K/UL (ref 0–0.44)
ABSOLUTE EOS #: <0.03 K/UL (ref 0–0.44)
ABSOLUTE IMMATURE GRANULOCYTE: 0.06 K/UL (ref 0–0.3)
ABSOLUTE IMMATURE GRANULOCYTE: 0.06 K/UL (ref 0–0.3)
ABSOLUTE IMMATURE GRANULOCYTE: 0.07 K/UL (ref 0–0.3)
ABSOLUTE IMMATURE GRANULOCYTE: 0.09 K/UL (ref 0–0.3)
ABSOLUTE IMMATURE GRANULOCYTE: 0.11 K/UL (ref 0–0.3)
ABSOLUTE IMMATURE GRANULOCYTE: 0.13 K/UL (ref 0–0.3)
ABSOLUTE IMMATURE GRANULOCYTE: 0.18 K/UL (ref 0–0.3)
ABSOLUTE IMMATURE GRANULOCYTE: 0.34 K/UL (ref 0–0.3)
ABSOLUTE IMMATURE GRANULOCYTE: ABNORMAL K/UL (ref 0–0.3)
ABSOLUTE LYMPH #: 0.93 K/UL (ref 1.1–3.7)
ABSOLUTE LYMPH #: 1.21 K/UL (ref 1.1–3.7)
ABSOLUTE LYMPH #: 1.27 K/UL (ref 1–4.8)
ABSOLUTE LYMPH #: 1.31 K/UL (ref 1.1–3.7)
ABSOLUTE LYMPH #: 1.59 K/UL (ref 1.1–3.7)
ABSOLUTE LYMPH #: 1.59 K/UL (ref 1–4.8)
ABSOLUTE LYMPH #: 1.88 K/UL (ref 1.1–3.7)
ABSOLUTE LYMPH #: 2.04 K/UL (ref 1.1–3.7)
ABSOLUTE LYMPH #: 3.02 K/UL (ref 1.1–3.7)
ABSOLUTE MONO #: 0.11 K/UL (ref 0.2–0.8)
ABSOLUTE MONO #: 0.34 K/UL (ref 0.1–1.2)
ABSOLUTE MONO #: 0.92 K/UL (ref 0.1–1.2)
ABSOLUTE MONO #: 1.04 K/UL (ref 0.1–1.2)
ABSOLUTE MONO #: 1.11 K/UL (ref 0.1–1.2)
ABSOLUTE MONO #: 1.13 K/UL (ref 0.1–1.2)
ABSOLUTE MONO #: 1.15 K/UL (ref 0.1–1.2)
ABSOLUTE MONO #: 1.27 K/UL (ref 0.1–1.2)
ABSOLUTE MONO #: 1.29 K/UL (ref 0.1–1.2)
ALBUMIN SERPL-MCNC: 1.7 G/DL (ref 3.5–5.2)
ALBUMIN SERPL-MCNC: 4.1 G/DL (ref 3.5–5.2)
ALBUMIN SERPL-MCNC: 4.6 G/DL (ref 3.5–5.2)
ALBUMIN/GLOBULIN RATIO: 1 (ref 1–2.5)
ALBUMIN/GLOBULIN RATIO: ABNORMAL (ref 1–2.5)
ALBUMIN/GLOBULIN RATIO: ABNORMAL (ref 1–2.5)
ALP BLD-CCNC: 101 U/L (ref 40–129)
ALP BLD-CCNC: 40 U/L (ref 40–129)
ALP BLD-CCNC: 94 U/L (ref 40–129)
ALT SERPL-CCNC: 1036 U/L (ref 5–41)
ALT SERPL-CCNC: 47 U/L (ref 5–41)
ALT SERPL-CCNC: 61 U/L (ref 5–41)
AMORPHOUS: NORMAL
ANION GAP SERPL CALCULATED.3IONS-SCNC: 10 MMOL/L (ref 9–17)
ANION GAP SERPL CALCULATED.3IONS-SCNC: 11 MMOL/L (ref 9–17)
ANION GAP SERPL CALCULATED.3IONS-SCNC: 14 MMOL/L (ref 9–17)
ANION GAP SERPL CALCULATED.3IONS-SCNC: 17 MMOL/L (ref 9–17)
ANION GAP SERPL CALCULATED.3IONS-SCNC: 30 MMOL/L (ref 9–17)
ANION GAP SERPL CALCULATED.3IONS-SCNC: 32 MMOL/L (ref 9–17)
ANION GAP SERPL CALCULATED.3IONS-SCNC: 52 MMOL/L (ref 9–17)
ANION GAP SERPL CALCULATED.3IONS-SCNC: 9 MMOL/L (ref 9–17)
ANTI-XA UNFRAC HEPARIN: 0.55 IU/L (ref 0.3–0.7)
ANTI-XA UNFRAC HEPARIN: 0.62 IU/L (ref 0.3–0.7)
ANTI-XA UNFRAC HEPARIN: 0.94 IU/L (ref 0.3–0.7)
ANTI-XA UNFRAC HEPARIN: <0.1 IU/L (ref 0.3–0.7)
ANTI-XA UNFRAC HEPARIN: <0.1 IU/L (ref 0.3–0.7)
AST SERPL-CCNC: 29 U/L
AST SERPL-CCNC: 37 U/L
AST SERPL-CCNC: 850 U/L
BACTERIA: NORMAL
BASOPHILS # BLD: 0 %
BASOPHILS # BLD: 0 % (ref 0–2)
BASOPHILS ABSOLUTE: 0 K/UL (ref 0–0.2)
BASOPHILS ABSOLUTE: 0.03 K/UL (ref 0–0.2)
BASOPHILS ABSOLUTE: 0.05 K/UL (ref 0–0.2)
BASOPHILS ABSOLUTE: 0.06 K/UL (ref 0–0.2)
BASOPHILS ABSOLUTE: <0.03 K/UL (ref 0–0.2)
BILIRUB SERPL-MCNC: 0.59 MG/DL (ref 0.3–1.2)
BILIRUB SERPL-MCNC: 1.48 MG/DL (ref 0.3–1.2)
BILIRUB SERPL-MCNC: 1.7 MG/DL (ref 0.3–1.2)
BILIRUBIN DIRECT: 0.29 MG/DL
BILIRUBIN URINE: NEGATIVE
BILIRUBIN, INDIRECT: 1.19 MG/DL (ref 0–1)
BLD PROD TYP BPU: NORMAL
BUN BLDV-MCNC: 10 MG/DL (ref 6–20)
BUN BLDV-MCNC: 12 MG/DL (ref 6–20)
BUN BLDV-MCNC: 19 MG/DL (ref 6–20)
BUN BLDV-MCNC: 21 MG/DL (ref 6–20)
BUN BLDV-MCNC: 22 MG/DL (ref 6–20)
BUN BLDV-MCNC: 35 MG/DL (ref 6–20)
BUN BLDV-MCNC: 38 MG/DL (ref 6–20)
BUN/CREAT BLD: 10 (ref 9–20)
BUN/CREAT BLD: 10 (ref 9–20)
BUN/CREAT BLD: 12 (ref 9–20)
BUN/CREAT BLD: 17 (ref 9–20)
BUN/CREAT BLD: 18 (ref 9–20)
BUN/CREAT BLD: 19 (ref 9–20)
BUN/CREAT BLD: 23 (ref 9–20)
BUN/CREAT BLD: 7 (ref 9–20)
BUN/CREAT BLD: 9 (ref 9–20)
BUN/CREAT BLD: ABNORMAL (ref 9–20)
CALCIUM SERPL-MCNC: 6.2 MG/DL (ref 8.6–10.4)
CALCIUM SERPL-MCNC: 6.2 MG/DL (ref 8.6–10.4)
CALCIUM SERPL-MCNC: 7.1 MG/DL (ref 8.6–10.4)
CALCIUM SERPL-MCNC: 7.9 MG/DL (ref 8.6–10.4)
CALCIUM SERPL-MCNC: 8.2 MG/DL (ref 8.6–10.4)
CALCIUM SERPL-MCNC: 8.3 MG/DL (ref 8.6–10.4)
CALCIUM SERPL-MCNC: 8.4 MG/DL (ref 8.6–10.4)
CALCIUM SERPL-MCNC: 8.5 MG/DL (ref 8.6–10.4)
CALCIUM SERPL-MCNC: 9.5 MG/DL (ref 8.6–10.4)
CALCIUM SERPL-MCNC: 9.7 MG/DL (ref 8.6–10.4)
CASTS UA: NORMAL /LPF
CHLORIDE BLD-SCNC: 102 MMOL/L (ref 98–107)
CHLORIDE BLD-SCNC: 104 MMOL/L (ref 98–107)
CHLORIDE BLD-SCNC: 104 MMOL/L (ref 98–107)
CHLORIDE BLD-SCNC: 105 MMOL/L (ref 98–107)
CHLORIDE BLD-SCNC: 106 MMOL/L (ref 98–107)
CHLORIDE BLD-SCNC: 109 MMOL/L (ref 98–107)
CHLORIDE BLD-SCNC: 113 MMOL/L (ref 98–107)
CHLORIDE BLD-SCNC: 114 MMOL/L (ref 98–107)
CHLORIDE BLD-SCNC: 114 MMOL/L (ref 98–107)
CHLORIDE BLD-SCNC: 91 MMOL/L (ref 98–107)
CO2: 11 MMOL/L (ref 20–31)
CO2: 22 MMOL/L (ref 20–31)
CO2: 24 MMOL/L (ref 20–31)
CO2: 26 MMOL/L (ref 20–31)
CO2: 28 MMOL/L (ref 20–31)
CO2: 9 MMOL/L (ref 20–31)
COLOR: YELLOW
COMMENT UA: ABNORMAL
CREAT SERPL-MCNC: 0.96 MG/DL (ref 0.7–1.2)
CREAT SERPL-MCNC: 0.98 MG/DL (ref 0.7–1.2)
CREAT SERPL-MCNC: 0.99 MG/DL (ref 0.7–1.2)
CREAT SERPL-MCNC: 1.05 MG/DL (ref 0.7–1.2)
CREAT SERPL-MCNC: 1.31 MG/DL (ref 0.7–1.2)
CREAT SERPL-MCNC: 1.5 MG/DL (ref 0.7–1.2)
CREAT SERPL-MCNC: 1.53 MG/DL (ref 0.7–1.2)
CREAT SERPL-MCNC: 2.07 MG/DL (ref 0.7–1.2)
CREAT SERPL-MCNC: 2.11 MG/DL (ref 0.7–1.2)
CREAT SERPL-MCNC: 2.82 MG/DL (ref 0.7–1.2)
CRYSTALS, UA: NORMAL /HPF
DIFFERENTIAL TYPE: ABNORMAL
DISPENSE STATUS BLOOD BANK: NORMAL
EKG ATRIAL RATE: 129 BPM
EKG P AXIS: 61 DEGREES
EKG P-R INTERVAL: 160 MS
EKG Q-T INTERVAL: 280 MS
EKG QRS DURATION: 80 MS
EKG QTC CALCULATION (BAZETT): 410 MS
EKG R AXIS: 53 DEGREES
EKG T AXIS: -3 DEGREES
EKG VENTRICULAR RATE: 129 BPM
EOSINOPHILS RELATIVE PERCENT: 0 % (ref 1–4)
EOSINOPHILS RELATIVE PERCENT: 1 % (ref 1–4)
EOSINOPHILS RELATIVE PERCENT: 1 % (ref 1–4)
EPITHELIAL CELLS UA: NORMAL /HPF (ref 0–5)
FIO2: 100
FIO2: 28
FIO2: 80
GFR AFRICAN AMERICAN: 29 ML/MIN
GFR AFRICAN AMERICAN: 40 ML/MIN
GFR AFRICAN AMERICAN: 41 ML/MIN
GFR AFRICAN AMERICAN: 58 ML/MIN
GFR AFRICAN AMERICAN: 60 ML/MIN
GFR AFRICAN AMERICAN: >60 ML/MIN
GFR NON-AFRICAN AMERICAN: 24 ML/MIN
GFR NON-AFRICAN AMERICAN: 33 ML/MIN
GFR NON-AFRICAN AMERICAN: 34 ML/MIN
GFR NON-AFRICAN AMERICAN: 48 ML/MIN
GFR NON-AFRICAN AMERICAN: 49 ML/MIN
GFR NON-AFRICAN AMERICAN: 58 ML/MIN
GFR NON-AFRICAN AMERICAN: >60 ML/MIN
GFR SERPL CREATININE-BSD FRML MDRD: ABNORMAL ML/MIN/{1.73_M2}
GLOBULIN: ABNORMAL G/DL (ref 1.5–3.8)
GLUCOSE BLD-MCNC: 110 MG/DL (ref 70–99)
GLUCOSE BLD-MCNC: 112 MG/DL (ref 70–99)
GLUCOSE BLD-MCNC: 123 MG/DL (ref 70–99)
GLUCOSE BLD-MCNC: 143 MG/DL (ref 70–99)
GLUCOSE BLD-MCNC: 159 MG/DL (ref 70–99)
GLUCOSE BLD-MCNC: 167 MG/DL (ref 70–99)
GLUCOSE BLD-MCNC: 184 MG/DL (ref 70–99)
GLUCOSE BLD-MCNC: 235 MG/DL (ref 70–99)
GLUCOSE BLD-MCNC: 374 MG/DL (ref 70–99)
GLUCOSE BLD-MCNC: 97 MG/DL (ref 70–99)
GLUCOSE URINE: NEGATIVE
HCT VFR BLD CALC: 19.2 % (ref 40.7–50.3)
HCT VFR BLD CALC: 19.9 % (ref 40.7–50.3)
HCT VFR BLD CALC: 20.5 % (ref 40.7–50.3)
HCT VFR BLD CALC: 20.7 % (ref 40.7–50.3)
HCT VFR BLD CALC: 22.4 % (ref 40.7–50.3)
HCT VFR BLD CALC: 22.6 % (ref 40.7–50.3)
HCT VFR BLD CALC: 23.4 % (ref 40.7–50.3)
HCT VFR BLD CALC: 39.1 % (ref 40.7–50.3)
HCT VFR BLD CALC: 44.4 % (ref 40.7–50.3)
HCT VFR BLD CALC: 46.8 % (ref 40.7–50.3)
HCT VFR BLD CALC: 47.6 % (ref 40.7–50.3)
HCT VFR BLD CALC: 50.3 % (ref 40.7–50.3)
HCT VFR BLD CALC: 52.4 % (ref 40.7–50.3)
HCT VFR BLD CALC: 59 % (ref 40.7–50.3)
HCT VFR BLD CALC: 61 % (ref 41–53)
HEMOGLOBIN: 12.7 G/DL (ref 13–17)
HEMOGLOBIN: 14 G/DL (ref 13–17)
HEMOGLOBIN: 14.9 G/DL (ref 13–17)
HEMOGLOBIN: 15.1 G/DL (ref 13–17)
HEMOGLOBIN: 16.2 G/DL (ref 13–17)
HEMOGLOBIN: 16.6 G/DL (ref 13–17)
HEMOGLOBIN: 19.1 G/DL (ref 13–17)
HEMOGLOBIN: 19.7 G/DL (ref 13.5–17.5)
HEMOGLOBIN: 5.7 G/DL (ref 13–17)
HEMOGLOBIN: 5.9 G/DL (ref 13–17)
HEMOGLOBIN: 6.7 G/DL (ref 13–17)
HEMOGLOBIN: 6.7 G/DL (ref 13–17)
HEMOGLOBIN: 7.3 G/DL (ref 13–17)
HEPARIN INDUCED PLATELET ANTIBODY: 0.14 O.D. (ref 0–0.4)
IMMATURE GRANULOCYTES: 1 %
IMMATURE GRANULOCYTES: 2 %
IMMATURE GRANULOCYTES: 5 %
IMMATURE GRANULOCYTES: ABNORMAL %
INR BLD: 1.4
INR BLD: 2.3
INR BLD: 4.7
INR BLD: 4.7
KETONES, URINE: NEGATIVE
LACTIC ACID: 1.5 MMOL/L (ref 0.5–2.2)
LACTIC ACID: 2.6 MMOL/L (ref 0.5–2.2)
LACTIC ACID: 3.5 MMOL/L (ref 0.5–2.2)
LEUKOCYTE ESTERASE, URINE: NEGATIVE
LIPASE: 12 U/L (ref 13–60)
LV EF: 60 %
LVEF MODALITY: NORMAL
LYMPHOCYTES # BLD: 11 % (ref 24–43)
LYMPHOCYTES # BLD: 11 % (ref 24–43)
LYMPHOCYTES # BLD: 12 % (ref 24–43)
LYMPHOCYTES # BLD: 18 % (ref 24–43)
LYMPHOCYTES # BLD: 22 % (ref 24–43)
LYMPHOCYTES # BLD: 28 % (ref 24–43)
LYMPHOCYTES # BLD: 30 % (ref 24–44)
LYMPHOCYTES # BLD: 5 % (ref 24–43)
LYMPHOCYTES # BLD: 7 % (ref 24–44)
MCH RBC QN AUTO: 27.4 PG (ref 25.2–33.5)
MCH RBC QN AUTO: 27.5 PG (ref 26–34)
MCH RBC QN AUTO: 27.8 PG (ref 25.2–33.5)
MCH RBC QN AUTO: 27.8 PG (ref 25.2–33.5)
MCH RBC QN AUTO: 28 PG (ref 25.2–33.5)
MCH RBC QN AUTO: 28.2 PG (ref 25.2–33.5)
MCH RBC QN AUTO: 28.2 PG (ref 25.2–33.5)
MCH RBC QN AUTO: 28.5 PG (ref 25.2–33.5)
MCH RBC QN AUTO: 28.8 PG (ref 25.2–33.5)
MCHC RBC AUTO-ENTMCNC: 28.8 G/DL (ref 28.4–34.8)
MCHC RBC AUTO-ENTMCNC: 29.7 G/DL (ref 28.4–34.8)
MCHC RBC AUTO-ENTMCNC: 31.2 G/DL (ref 28.4–34.8)
MCHC RBC AUTO-ENTMCNC: 31.3 G/DL (ref 28.4–34.8)
MCHC RBC AUTO-ENTMCNC: 31.5 G/DL (ref 28.4–34.8)
MCHC RBC AUTO-ENTMCNC: 31.7 G/DL (ref 28.4–34.8)
MCHC RBC AUTO-ENTMCNC: 32.2 G/DL (ref 28.4–34.8)
MCHC RBC AUTO-ENTMCNC: 32.3 G/DL (ref 28.4–34.8)
MCHC RBC AUTO-ENTMCNC: 32.3 G/DL (ref 31–37)
MCHC RBC AUTO-ENTMCNC: 32.4 G/DL (ref 28.4–34.8)
MCHC RBC AUTO-ENTMCNC: 32.5 G/DL (ref 28.4–34.8)
MCV RBC AUTO: 85.3 FL (ref 80–100)
MCV RBC AUTO: 86.1 FL (ref 82.6–102.9)
MCV RBC AUTO: 86.6 FL (ref 82.6–102.9)
MCV RBC AUTO: 86.7 FL (ref 82.6–102.9)
MCV RBC AUTO: 87.5 FL (ref 82.6–102.9)
MCV RBC AUTO: 87.6 FL (ref 82.6–102.9)
MCV RBC AUTO: 87.7 FL (ref 82.6–102.9)
MCV RBC AUTO: 88.1 FL (ref 82.6–102.9)
MCV RBC AUTO: 91.4 FL (ref 82.6–102.9)
MCV RBC AUTO: 97 FL (ref 82.6–102.9)
MCV RBC AUTO: 98.1 FL (ref 82.6–102.9)
MONOCYTES # BLD: 10 % (ref 3–12)
MONOCYTES # BLD: 2 % (ref 1–7)
MONOCYTES # BLD: 5 % (ref 3–12)
MONOCYTES # BLD: 6 % (ref 3–12)
MONOCYTES # BLD: 7 % (ref 2–11)
MONOCYTES # BLD: 8 % (ref 3–12)
MONOCYTES # BLD: 9 % (ref 3–12)
MORPHOLOGY: ABNORMAL
MORPHOLOGY: ABNORMAL
MORPHOLOGY: NORMAL
MUCUS: NORMAL
NEGATIVE BASE EXCESS, ART: 11 (ref 0–2)
NEGATIVE BASE EXCESS, ART: 16 (ref 0–2)
NEGATIVE BASE EXCESS, ART: 20 (ref 0–2)
NEGATIVE BASE EXCESS, ART: 21 (ref 0–2)
NEGATIVE BASE EXCESS, ART: 23 (ref 0–2)
NEGATIVE BASE EXCESS, ART: 9 (ref 0–2)
NEGATIVE BASE EXCESS, ART: ABNORMAL (ref 0–2)
NITRITE, URINE: NEGATIVE
NRBC AUTOMATED: 0 PER 100 WBC
NRBC AUTOMATED: 0.4 PER 100 WBC
NRBC AUTOMATED: 0.5 PER 100 WBC
NRBC AUTOMATED: 0.8 PER 100 WBC
NRBC AUTOMATED: ABNORMAL PER 100 WBC
O2 DEVICE/FLOW/%: ABNORMAL
OTHER OBSERVATIONS UA: NORMAL
PARTIAL THROMBOPLASTIN TIME: 117.1 SEC (ref 23.9–33.8)
PARTIAL THROMBOPLASTIN TIME: 31.8 SEC (ref 23.9–33.8)
PARTIAL THROMBOPLASTIN TIME: 70.1 SEC (ref 23.9–33.8)
PARTIAL THROMBOPLASTIN TIME: >150 SEC (ref 23.9–33.8)
PATIENT TEMP: 37
PATIENT TEMP: ABNORMAL
PDW BLD-RTO: 12.9 % (ref 11.8–14.4)
PDW BLD-RTO: 13.1 % (ref 11.8–14.4)
PDW BLD-RTO: 13.3 % (ref 11.8–14.4)
PDW BLD-RTO: 13.4 % (ref 11.8–14.4)
PDW BLD-RTO: 13.5 % (ref 11.8–14.4)
PDW BLD-RTO: 14 % (ref 12.5–15.4)
PDW BLD-RTO: 14.4 % (ref 11.8–14.4)
PDW BLD-RTO: 14.6 % (ref 11.8–14.4)
PDW BLD-RTO: 14.7 % (ref 11.8–14.4)
PH UA: 7 (ref 5–8)
PLATELET # BLD: 104 K/UL (ref 138–453)
PLATELET # BLD: 108 K/UL (ref 138–453)
PLATELET # BLD: 132 K/UL (ref 138–453)
PLATELET # BLD: 165 K/UL (ref 138–453)
PLATELET # BLD: 184 K/UL (ref 138–453)
PLATELET # BLD: 202 K/UL (ref 138–453)
PLATELET # BLD: 225 K/UL (ref 140–450)
PLATELET # BLD: 76 K/UL (ref 138–453)
PLATELET # BLD: 98 K/UL (ref 138–453)
PLATELET # BLD: ABNORMAL K/UL (ref 138–453)
PLATELET # BLD: ABNORMAL K/UL (ref 138–453)
PLATELET ESTIMATE: ABNORMAL
PLATELET, FLUORESCENCE: 21 K/UL (ref 138–453)
PLATELET, FLUORESCENCE: 34 K/UL (ref 138–453)
PLATELET, IMMATURE FRACTION: 11.1 % (ref 1.1–10.3)
PLATELET, IMMATURE FRACTION: 11.7 % (ref 1.1–10.3)
PMV BLD AUTO: 10.8 FL (ref 8.1–13.5)
PMV BLD AUTO: 10.8 FL (ref 8.1–13.5)
PMV BLD AUTO: 10.9 FL (ref 8.1–13.5)
PMV BLD AUTO: 11 FL (ref 8.1–13.5)
PMV BLD AUTO: 11 FL (ref 8.1–13.5)
PMV BLD AUTO: 11.1 FL (ref 8.1–13.5)
PMV BLD AUTO: 11.3 FL (ref 8.1–13.5)
PMV BLD AUTO: 12.4 FL (ref 8.1–13.5)
PMV BLD AUTO: 9.2 FL (ref 6–12)
PMV BLD AUTO: ABNORMAL FL (ref 8.1–13.5)
PMV BLD AUTO: ABNORMAL FL (ref 8.1–13.5)
POC HCO3: 10.4 MMOL/L (ref 22–27)
POC HCO3: 11.4 MMOL/L (ref 22–27)
POC HCO3: 15.2 MMOL/L (ref 22–27)
POC HCO3: 17.1 MMOL/L (ref 22–27)
POC HCO3: 23.9 MMOL/L (ref 22–27)
POC HCO3: 56.2 MMOL/L (ref 22–27)
POC HCO3: 56.5 MMOL/L (ref 22–27)
POC HCO3: 9.4 MMOL/L (ref 22–27)
POC HCO3: 9.5 MMOL/L (ref 22–27)
POC HCO3: 9.8 MMOL/L (ref 22–27)
POC HCO3: 9.9 MMOL/L (ref 22–27)
POC O2 SATURATION: 55 %
POC O2 SATURATION: 61 %
POC O2 SATURATION: 65 %
POC O2 SATURATION: 68 %
POC O2 SATURATION: 69 %
POC O2 SATURATION: 79 %
POC O2 SATURATION: 79 %
POC O2 SATURATION: 87 %
POC O2 SATURATION: 90 %
POC O2 SATURATION: 95 %
POC O2 SATURATION: 96 %
POC PCO2 TEMP: ABNORMAL MM HG
POC PCO2: 26 MM HG (ref 32–45)
POC PCO2: 30 MM HG (ref 32–45)
POC PCO2: 32 MM HG (ref 32–45)
POC PCO2: 33 MM HG (ref 32–45)
POC PCO2: 34 MM HG (ref 32–45)
POC PCO2: 34 MM HG (ref 32–45)
POC PCO2: 35 MM HG (ref 32–45)
POC PCO2: 36 MM HG (ref 32–45)
POC PCO2: 52 MM HG (ref 32–45)
POC PCO2: 52 MM HG (ref 32–45)
POC PCO2: 53 MM HG (ref 32–45)
POC PH TEMP: ABNORMAL
POC PH: 6.88 (ref 7.35–7.45)
POC PH: 7.05 (ref 7.35–7.45)
POC PH: 7.06 (ref 7.35–7.45)
POC PH: 7.07 (ref 7.35–7.45)
POC PH: 7.11 (ref 7.35–7.45)
POC PH: 7.25 (ref 7.35–7.45)
POC PH: 7.29 (ref 7.35–7.45)
POC PH: 7.31 (ref 7.35–7.45)
POC PH: 7.47 (ref 7.35–7.45)
POC PH: 7.63 (ref 7.35–7.45)
POC PH: 7.64 (ref 7.35–7.45)
POC PO2 TEMP: ABNORMAL MM HG
POC PO2: 134 MM HG (ref 75–95)
POC PO2: 37 MM HG (ref 75–95)
POC PO2: 37 MM HG (ref 75–95)
POC PO2: 40 MM HG (ref 75–95)
POC PO2: 40 MM HG (ref 75–95)
POC PO2: 44 MM HG (ref 75–95)
POC PO2: 47 MM HG (ref 75–95)
POC PO2: 47 MM HG (ref 75–95)
POC PO2: 49 MM HG (ref 75–95)
POC PO2: 64 MM HG (ref 75–95)
POC PO2: 87 MM HG (ref 75–95)
POSITIVE BASE EXCESS, ART: 0 (ref 0–2)
POSITIVE BASE EXCESS, ART: >30 (ref 0–2)
POSITIVE BASE EXCESS, ART: >30 (ref 0–2)
POSITIVE BASE EXCESS, ART: ABNORMAL (ref 0–2)
POTASSIUM SERPL-SCNC: 3.6 MMOL/L (ref 3.7–5.3)
POTASSIUM SERPL-SCNC: 3.8 MMOL/L (ref 3.7–5.3)
POTASSIUM SERPL-SCNC: 3.9 MMOL/L (ref 3.7–5.3)
POTASSIUM SERPL-SCNC: 4 MMOL/L (ref 3.7–5.3)
POTASSIUM SERPL-SCNC: 4.1 MMOL/L (ref 3.7–5.3)
POTASSIUM SERPL-SCNC: 4.1 MMOL/L (ref 3.7–5.3)
POTASSIUM SERPL-SCNC: 4.5 MMOL/L (ref 3.7–5.3)
PROTEIN UA: ABNORMAL
PROTHROMBIN TIME: 16.8 SEC (ref 11.5–14.2)
PROTHROMBIN TIME: 25.4 SEC (ref 11.5–14.2)
PROTHROMBIN TIME: 44.2 SEC (ref 11.5–14.2)
PROTHROMBIN TIME: 44.4 SEC (ref 11.5–14.2)
RBC # BLD: 1.98 M/UL (ref 4.21–5.77)
RBC # BLD: 2.09 M/UL (ref 4.21–5.77)
RBC # BLD: 2.56 M/UL (ref 4.21–5.77)
RBC # BLD: 4.54 M/UL (ref 4.21–5.77)
RBC # BLD: 5.04 M/UL (ref 4.21–5.77)
RBC # BLD: 5.4 M/UL (ref 4.21–5.77)
RBC # BLD: 5.43 M/UL (ref 4.21–5.77)
RBC # BLD: 5.75 M/UL (ref 4.21–5.77)
RBC # BLD: 5.98 M/UL (ref 4.21–5.77)
RBC # BLD: 6.81 M/UL (ref 4.21–5.77)
RBC # BLD: 7.15 M/UL (ref 4.5–5.9)
RBC # BLD: ABNORMAL 10*6/UL
RBC UA: NORMAL /HPF (ref 0–2)
RENAL EPITHELIAL, UA: NORMAL /HPF
SARS-COV-2 ANTIBODY, TOTAL: NEGATIVE
SARS-COV-2, PCR: NORMAL
SARS-COV-2, RAPID: NORMAL
SARS-COV-2: NOT DETECTED
SEG NEUTROPHILS: 62 % (ref 36–65)
SEG NEUTROPHILS: 66 % (ref 36–66)
SEG NEUTROPHILS: 67 % (ref 36–65)
SEG NEUTROPHILS: 72 % (ref 36–65)
SEG NEUTROPHILS: 78 % (ref 36–65)
SEG NEUTROPHILS: 78 % (ref 36–65)
SEG NEUTROPHILS: 79 % (ref 36–65)
SEG NEUTROPHILS: 86 % (ref 36–66)
SEG NEUTROPHILS: 88 % (ref 36–65)
SEGMENTED NEUTROPHILS ABSOLUTE COUNT: 10.84 K/UL (ref 1.5–8.1)
SEGMENTED NEUTROPHILS ABSOLUTE COUNT: 15.56 K/UL (ref 1.8–7.7)
SEGMENTED NEUTROPHILS ABSOLUTE COUNT: 16.58 K/UL (ref 1.5–8.1)
SEGMENTED NEUTROPHILS ABSOLUTE COUNT: 3.49 K/UL (ref 1.8–7.7)
SEGMENTED NEUTROPHILS ABSOLUTE COUNT: 4.14 K/UL (ref 1.5–8.1)
SEGMENTED NEUTROPHILS ABSOLUTE COUNT: 8.08 K/UL (ref 1.5–8.1)
SEGMENTED NEUTROPHILS ABSOLUTE COUNT: 8.91 K/UL (ref 1.5–8.1)
SEGMENTED NEUTROPHILS ABSOLUTE COUNT: 8.94 K/UL (ref 1.5–8.1)
SEGMENTED NEUTROPHILS ABSOLUTE COUNT: 9.04 K/UL (ref 1.5–8.1)
SODIUM BLD-SCNC: 139 MMOL/L (ref 135–144)
SODIUM BLD-SCNC: 142 MMOL/L (ref 135–144)
SODIUM BLD-SCNC: 144 MMOL/L (ref 135–144)
SODIUM BLD-SCNC: 145 MMOL/L (ref 135–144)
SODIUM BLD-SCNC: 148 MMOL/L (ref 135–144)
SODIUM BLD-SCNC: 150 MMOL/L (ref 135–144)
SODIUM BLD-SCNC: 150 MMOL/L (ref 135–144)
SODIUM BLD-SCNC: 151 MMOL/L (ref 135–144)
SODIUM BLD-SCNC: 152 MMOL/L (ref 135–144)
SODIUM BLD-SCNC: 154 MMOL/L (ref 135–144)
SOURCE: NORMAL
SPECIFIC GRAVITY UA: 1.01 (ref 1–1.03)
TCO2 (CALC), ART: 10 MMOL/L (ref 23–28)
TCO2 (CALC), ART: 10 MMOL/L (ref 23–28)
TCO2 (CALC), ART: 11 MMOL/L (ref 23–28)
TCO2 (CALC), ART: 11 MMOL/L (ref 23–28)
TCO2 (CALC), ART: 12 MMOL/L (ref 23–28)
TCO2 (CALC), ART: 12 MMOL/L (ref 23–28)
TCO2 (CALC), ART: 16 MMOL/L (ref 23–28)
TCO2 (CALC), ART: 18 MMOL/L (ref 23–28)
TCO2 (CALC), ART: 25 MMOL/L (ref 23–28)
TCO2 (CALC), ART: >50 MMOL/L (ref 23–28)
TCO2 (CALC), ART: >50 MMOL/L (ref 23–28)
TOTAL PROTEIN: 3.2 G/DL (ref 6.4–8.3)
TOTAL PROTEIN: 8.6 G/DL (ref 6.4–8.3)
TOTAL PROTEIN: 9.2 G/DL (ref 6.4–8.3)
TRANSFUSION STATUS: NORMAL
TRICHOMONAS: NORMAL
TURBIDITY: CLEAR
UNIT DIVISION: 0
UNIT NUMBER: NORMAL
URINE HGB: ABNORMAL
UROBILINOGEN, URINE: NORMAL
WBC # BLD: 11.2 K/UL (ref 3.5–11.3)
WBC # BLD: 11.3 K/UL (ref 3.5–11.3)
WBC # BLD: 11.5 K/UL (ref 3.5–11.3)
WBC # BLD: 13.5 K/UL (ref 3.5–11.3)
WBC # BLD: 13.5 K/UL (ref 3.5–11.3)
WBC # BLD: 13.8 K/UL (ref 3.5–11.3)
WBC # BLD: 13.9 K/UL (ref 3.5–11.3)
WBC # BLD: 18.1 K/UL (ref 3.5–11)
WBC # BLD: 18.9 K/UL (ref 3.5–11.3)
WBC # BLD: 5.3 K/UL (ref 3.5–11.3)
WBC # BLD: 6.7 K/UL (ref 3.5–11.3)
WBC # BLD: ABNORMAL 10*3/UL
WBC UA: NORMAL /HPF (ref 0–5)
YEAST: NORMAL

## 2020-01-01 PROCEDURE — 6360000004 HC RX CONTRAST MEDICATION: Performed by: INTERNAL MEDICINE

## 2020-01-01 PROCEDURE — 2580000003 HC RX 258: Performed by: NURSE ANESTHETIST, CERTIFIED REGISTERED

## 2020-01-01 PROCEDURE — 86900 BLOOD TYPING SEROLOGIC ABO: CPT

## 2020-01-01 PROCEDURE — 37799 UNLISTED PX VASCULAR SURGERY: CPT

## 2020-01-01 PROCEDURE — 7100000001 HC PACU RECOVERY - ADDTL 15 MIN: Performed by: SURGERY

## 2020-01-01 PROCEDURE — 2500000003 HC RX 250 WO HCPCS

## 2020-01-01 PROCEDURE — 51702 INSERT TEMP BLADDER CATH: CPT

## 2020-01-01 PROCEDURE — 2580000003 HC RX 258: Performed by: FAMILY MEDICINE

## 2020-01-01 PROCEDURE — G8420 CALC BMI NORM PARAMETERS: HCPCS | Performed by: FAMILY MEDICINE

## 2020-01-01 PROCEDURE — 99233 SBSQ HOSP IP/OBS HIGH 50: CPT | Performed by: FAMILY MEDICINE

## 2020-01-01 PROCEDURE — 36600 WITHDRAWAL OF ARTERIAL BLOOD: CPT

## 2020-01-01 PROCEDURE — 0YU50JZ SUPPLEMENT RIGHT INGUINAL REGION WITH SYNTHETIC SUBSTITUTE, OPEN APPROACH: ICD-10-PCS | Performed by: SURGERY

## 2020-01-01 PROCEDURE — 2709999900 HC NON-CHARGEABLE SUPPLY: Performed by: SURGERY

## 2020-01-01 PROCEDURE — 6370000000 HC RX 637 (ALT 250 FOR IP): Performed by: UROLOGY

## 2020-01-01 PROCEDURE — P9017 PLASMA 1 DONOR FRZ W/IN 8 HR: HCPCS

## 2020-01-01 PROCEDURE — 6360000002 HC RX W HCPCS: Performed by: INTERNAL MEDICINE

## 2020-01-01 PROCEDURE — 2500000003 HC RX 250 WO HCPCS: Performed by: INTERNAL MEDICINE

## 2020-01-01 PROCEDURE — 85025 COMPLETE CBC W/AUTO DIFF WBC: CPT

## 2020-01-01 PROCEDURE — 6360000002 HC RX W HCPCS: Performed by: FAMILY MEDICINE

## 2020-01-01 PROCEDURE — U0003 INFECTIOUS AGENT DETECTION BY NUCLEIC ACID (DNA OR RNA); SEVERE ACUTE RESPIRATORY SYNDROME CORONAVIRUS 2 (SARS-COV-2) (CORONAVIRUS DISEASE [COVID-19]), AMPLIFIED PROBE TECHNIQUE, MAKING USE OF HIGH THROUGHPUT TECHNOLOGIES AS DESCRIBED BY CMS-2020-01-R: HCPCS

## 2020-01-01 PROCEDURE — C9113 INJ PANTOPRAZOLE SODIUM, VIA: HCPCS | Performed by: FAMILY MEDICINE

## 2020-01-01 PROCEDURE — 85014 HEMATOCRIT: CPT

## 2020-01-01 PROCEDURE — 85055 RETICULATED PLATELET ASSAY: CPT

## 2020-01-01 PROCEDURE — 2700000000 HC OXYGEN THERAPY PER DAY

## 2020-01-01 PROCEDURE — 83690 ASSAY OF LIPASE: CPT

## 2020-01-01 PROCEDURE — 86920 COMPATIBILITY TEST SPIN: CPT

## 2020-01-01 PROCEDURE — 93005 ELECTROCARDIOGRAM TRACING: CPT | Performed by: FAMILY MEDICINE

## 2020-01-01 PROCEDURE — 83605 ASSAY OF LACTIC ACID: CPT

## 2020-01-01 PROCEDURE — 94640 AIRWAY INHALATION TREATMENT: CPT

## 2020-01-01 PROCEDURE — 6370000000 HC RX 637 (ALT 250 FOR IP): Performed by: SURGERY

## 2020-01-01 PROCEDURE — 6370000000 HC RX 637 (ALT 250 FOR IP): Performed by: STUDENT IN AN ORGANIZED HEALTH CARE EDUCATION/TRAINING PROGRAM

## 2020-01-01 PROCEDURE — 6360000002 HC RX W HCPCS: Performed by: STUDENT IN AN ORGANIZED HEALTH CARE EDUCATION/TRAINING PROGRAM

## 2020-01-01 PROCEDURE — 86927 PLASMA FRESH FROZEN: CPT

## 2020-01-01 PROCEDURE — 80048 BASIC METABOLIC PNL TOTAL CA: CPT

## 2020-01-01 PROCEDURE — 99239 HOSP IP/OBS DSCHRG MGMT >30: CPT | Performed by: FAMILY MEDICINE

## 2020-01-01 PROCEDURE — 2580000003 HC RX 258: Performed by: INTERNAL MEDICINE

## 2020-01-01 PROCEDURE — 94770 HC ETCO2 MONITOR DAILY: CPT

## 2020-01-01 PROCEDURE — 71260 CT THORAX DX C+: CPT

## 2020-01-01 PROCEDURE — 2000000000 HC ICU R&B

## 2020-01-01 PROCEDURE — 1200000000 HC SEMI PRIVATE

## 2020-01-01 PROCEDURE — 94761 N-INVAS EAR/PLS OXIMETRY MLT: CPT

## 2020-01-01 PROCEDURE — 99213 OFFICE O/P EST LOW 20 MIN: CPT | Performed by: FAMILY MEDICINE

## 2020-01-01 PROCEDURE — 74177 CT ABD & PELVIS W/CONTRAST: CPT

## 2020-01-01 PROCEDURE — 94003 VENT MGMT INPAT SUBQ DAY: CPT

## 2020-01-01 PROCEDURE — 85730 THROMBOPLASTIN TIME PARTIAL: CPT

## 2020-01-01 PROCEDURE — 2500000003 HC RX 250 WO HCPCS: Performed by: NURSE PRACTITIONER

## 2020-01-01 PROCEDURE — 36415 COLL VENOUS BLD VENIPUNCTURE: CPT

## 2020-01-01 PROCEDURE — 86022 PLATELET ANTIBODIES: CPT

## 2020-01-01 PROCEDURE — 86850 RBC ANTIBODY SCREEN: CPT

## 2020-01-01 PROCEDURE — 3700000001 HC ADD 15 MINUTES (ANESTHESIA): Performed by: SURGERY

## 2020-01-01 PROCEDURE — 93306 TTE W/DOPPLER COMPLETE: CPT

## 2020-01-01 PROCEDURE — 1036F TOBACCO NON-USER: CPT | Performed by: FAMILY MEDICINE

## 2020-01-01 PROCEDURE — 86901 BLOOD TYPING SEROLOGIC RH(D): CPT

## 2020-01-01 PROCEDURE — G8427 DOCREV CUR MEDS BY ELIG CLIN: HCPCS | Performed by: FAMILY MEDICINE

## 2020-01-01 PROCEDURE — 3600000009 HC SURGERY ROBOT BASE: Performed by: SURGERY

## 2020-01-01 PROCEDURE — 81001 URINALYSIS AUTO W/SCOPE: CPT

## 2020-01-01 PROCEDURE — 85610 PROTHROMBIN TIME: CPT

## 2020-01-01 PROCEDURE — 85520 HEPARIN ASSAY: CPT

## 2020-01-01 PROCEDURE — P9016 RBC LEUKOCYTES REDUCED: HCPCS

## 2020-01-01 PROCEDURE — 2580000003 HC RX 258: Performed by: EMERGENCY MEDICINE

## 2020-01-01 PROCEDURE — 6360000004 HC RX CONTRAST MEDICATION: Performed by: EMERGENCY MEDICINE

## 2020-01-01 PROCEDURE — 1036F TOBACCO NON-USER: CPT | Performed by: NURSE PRACTITIONER

## 2020-01-01 PROCEDURE — G8482 FLU IMMUNIZE ORDER/ADMIN: HCPCS | Performed by: FAMILY MEDICINE

## 2020-01-01 PROCEDURE — 3600000019 HC SURGERY ROBOT ADDTL 15MIN: Performed by: SURGERY

## 2020-01-01 PROCEDURE — 71045 X-RAY EXAM CHEST 1 VIEW: CPT

## 2020-01-01 PROCEDURE — 7100000000 HC PACU RECOVERY - FIRST 15 MIN: Performed by: SURGERY

## 2020-01-01 PROCEDURE — G8419 CALC BMI OUT NRM PARAM NOF/U: HCPCS | Performed by: FAMILY MEDICINE

## 2020-01-01 PROCEDURE — 6360000002 HC RX W HCPCS: Performed by: NURSE ANESTHETIST, CERTIFIED REGISTERED

## 2020-01-01 PROCEDURE — G8427 DOCREV CUR MEDS BY ELIG CLIN: HCPCS | Performed by: NURSE PRACTITIONER

## 2020-01-01 PROCEDURE — 3017F COLORECTAL CA SCREEN DOC REV: CPT | Performed by: FAMILY MEDICINE

## 2020-01-01 PROCEDURE — 02HV33Z INSERTION OF INFUSION DEVICE INTO SUPERIOR VENA CAVA, PERCUTANEOUS APPROACH: ICD-10-PCS | Performed by: RADIOLOGY

## 2020-01-01 PROCEDURE — 85027 COMPLETE CBC AUTOMATED: CPT

## 2020-01-01 PROCEDURE — 77001 FLUOROGUIDE FOR VEIN DEVICE: CPT | Performed by: RADIOLOGY

## 2020-01-01 PROCEDURE — 3700000000 HC ANESTHESIA ATTENDED CARE: Performed by: SURGERY

## 2020-01-01 PROCEDURE — 2500000003 HC RX 250 WO HCPCS: Performed by: SURGERY

## 2020-01-01 PROCEDURE — 6360000002 HC RX W HCPCS: Performed by: NURSE PRACTITIONER

## 2020-01-01 PROCEDURE — 6360000002 HC RX W HCPCS: Performed by: EMERGENCY MEDICINE

## 2020-01-01 PROCEDURE — 85018 HEMOGLOBIN: CPT

## 2020-01-01 PROCEDURE — 36430 TRANSFUSION BLD/BLD COMPNT: CPT

## 2020-01-01 PROCEDURE — 5A1935Z RESPIRATORY VENTILATION, LESS THAN 24 CONSECUTIVE HOURS: ICD-10-PCS | Performed by: INTERNAL MEDICINE

## 2020-01-01 PROCEDURE — 82803 BLOOD GASES ANY COMBINATION: CPT

## 2020-01-01 PROCEDURE — C1781 MESH (IMPLANTABLE): HCPCS | Performed by: SURGERY

## 2020-01-01 PROCEDURE — 86769 SARS-COV-2 COVID-19 ANTIBODY: CPT

## 2020-01-01 PROCEDURE — S2900 ROBOTIC SURGICAL SYSTEM: HCPCS | Performed by: SURGERY

## 2020-01-01 PROCEDURE — 80053 COMPREHEN METABOLIC PANEL: CPT

## 2020-01-01 PROCEDURE — 76937 US GUIDE VASCULAR ACCESS: CPT | Performed by: RADIOLOGY

## 2020-01-01 PROCEDURE — 2580000003 HC RX 258: Performed by: STUDENT IN AN ORGANIZED HEALTH CARE EDUCATION/TRAINING PROGRAM

## 2020-01-01 PROCEDURE — 80076 HEPATIC FUNCTION PANEL: CPT

## 2020-01-01 PROCEDURE — 96365 THER/PROPH/DIAG IV INF INIT: CPT

## 2020-01-01 PROCEDURE — 74022 RADEX COMPL AQT ABD SERIES: CPT

## 2020-01-01 PROCEDURE — 2709999900 IR FLUORO GUIDED CVA DEVICE PLMT/REPLACE/REMOVAL

## 2020-01-01 PROCEDURE — 99214 OFFICE O/P EST MOD 30 MIN: CPT | Performed by: FAMILY MEDICINE

## 2020-01-01 PROCEDURE — 99214 OFFICE O/P EST MOD 30 MIN: CPT | Performed by: NURSE PRACTITIONER

## 2020-01-01 PROCEDURE — 0BH17EZ INSERTION OF ENDOTRACHEAL AIRWAY INTO TRACHEA, VIA NATURAL OR ARTIFICIAL OPENING: ICD-10-PCS | Performed by: INTERNAL MEDICINE

## 2020-01-01 PROCEDURE — 96361 HYDRATE IV INFUSION ADD-ON: CPT

## 2020-01-01 PROCEDURE — 94002 VENT MGMT INPAT INIT DAY: CPT

## 2020-01-01 PROCEDURE — 96375 TX/PRO/DX INJ NEW DRUG ADDON: CPT

## 2020-01-01 PROCEDURE — 99223 1ST HOSP IP/OBS HIGH 75: CPT | Performed by: FAMILY MEDICINE

## 2020-01-01 PROCEDURE — 8E0W4CZ ROBOTIC ASSISTED PROCEDURE OF TRUNK REGION, PERCUTANEOUS ENDOSCOPIC APPROACH: ICD-10-PCS | Performed by: SURGERY

## 2020-01-01 PROCEDURE — 3017F COLORECTAL CA SCREEN DOC REV: CPT | Performed by: NURSE PRACTITIONER

## 2020-01-01 PROCEDURE — 2500000003 HC RX 250 WO HCPCS: Performed by: NURSE ANESTHETIST, CERTIFIED REGISTERED

## 2020-01-01 PROCEDURE — P9059 PLASMA, FRZ BETWEEN 8-24HOUR: HCPCS

## 2020-01-01 PROCEDURE — 74018 RADEX ABDOMEN 1 VIEW: CPT

## 2020-01-01 PROCEDURE — 36620 INSERTION CATHETER ARTERY: CPT

## 2020-01-01 PROCEDURE — 92950 HEART/LUNG RESUSCITATION CPR: CPT

## 2020-01-01 PROCEDURE — 2580000003 HC RX 258: Performed by: NURSE PRACTITIONER

## 2020-01-01 PROCEDURE — G8482 FLU IMMUNIZE ORDER/ADMIN: HCPCS | Performed by: NURSE PRACTITIONER

## 2020-01-01 PROCEDURE — 36556 INSERT NON-TUNNEL CV CATH: CPT | Performed by: RADIOLOGY

## 2020-01-01 PROCEDURE — 6370000000 HC RX 637 (ALT 250 FOR IP): Performed by: INTERNAL MEDICINE

## 2020-01-01 PROCEDURE — 74176 CT ABD & PELVIS W/O CONTRAST: CPT

## 2020-01-01 PROCEDURE — 6360000002 HC RX W HCPCS

## 2020-01-01 PROCEDURE — 51798 US URINE CAPACITY MEASURE: CPT

## 2020-01-01 PROCEDURE — G8420 CALC BMI NORM PARAMETERS: HCPCS | Performed by: NURSE PRACTITIONER

## 2020-01-01 PROCEDURE — 99285 EMERGENCY DEPT VISIT HI MDM: CPT

## 2020-01-01 PROCEDURE — 3E04317 INTRODUCTION OF OTHER THROMBOLYTIC INTO CENTRAL VEIN, PERCUTANEOUS APPROACH: ICD-10-PCS | Performed by: RADIOLOGY

## 2020-01-01 PROCEDURE — 93970 EXTREMITY STUDY: CPT

## 2020-01-01 DEVICE — MESH HERN W15XL10CM TEXTILE MFIL POLYETH TEREPHTHALATE: Type: IMPLANTABLE DEVICE | Site: ABDOMEN | Status: FUNCTIONAL

## 2020-01-01 RX ORDER — DEXAMETHASONE SODIUM PHOSPHATE 10 MG/ML
INJECTION, SOLUTION INTRAMUSCULAR; INTRAVENOUS PRN
Status: DISCONTINUED | OUTPATIENT
Start: 2020-01-01 | End: 2020-01-01 | Stop reason: SDUPTHER

## 2020-01-01 RX ORDER — FENTANYL CITRATE 50 UG/ML
50 INJECTION, SOLUTION INTRAMUSCULAR; INTRAVENOUS ONCE
Status: DISCONTINUED | OUTPATIENT
Start: 2020-01-01 | End: 2020-01-01

## 2020-01-01 RX ORDER — ROCURONIUM BROMIDE 10 MG/ML
INJECTION, SOLUTION INTRAVENOUS PRN
Status: DISCONTINUED | OUTPATIENT
Start: 2020-01-01 | End: 2020-01-01 | Stop reason: SDUPTHER

## 2020-01-01 RX ORDER — HEPARIN SODIUM 5000 [USP'U]/ML
40 INJECTION, SOLUTION INTRAVENOUS; SUBCUTANEOUS PRN
Status: DISCONTINUED | OUTPATIENT
Start: 2020-01-01 | End: 2020-01-01 | Stop reason: HOSPADM

## 2020-01-01 RX ORDER — ACETAMINOPHEN 325 MG/1
650 TABLET ORAL EVERY 4 HOURS PRN
Status: DISCONTINUED | OUTPATIENT
Start: 2020-01-01 | End: 2020-01-01 | Stop reason: HOSPADM

## 2020-01-01 RX ORDER — SODIUM CHLORIDE 9 MG/ML
INJECTION, SOLUTION INTRAVENOUS CONTINUOUS
Status: DISCONTINUED | OUTPATIENT
Start: 2020-01-01 | End: 2020-01-01

## 2020-01-01 RX ORDER — LIDOCAINE HYDROCHLORIDE 20 MG/ML
INJECTION, SOLUTION EPIDURAL; INFILTRATION; INTRACAUDAL; PERINEURAL PRN
Status: DISCONTINUED | OUTPATIENT
Start: 2020-01-01 | End: 2020-01-01 | Stop reason: SDUPTHER

## 2020-01-01 RX ORDER — HEPARIN SODIUM 10000 [USP'U]/100ML
18 INJECTION, SOLUTION INTRAVENOUS CONTINUOUS
Status: DISCONTINUED | OUTPATIENT
Start: 2020-01-01 | End: 2020-01-01 | Stop reason: HOSPADM

## 2020-01-01 RX ORDER — HEPARIN SODIUM 5000 [USP'U]/ML
80 INJECTION, SOLUTION INTRAVENOUS; SUBCUTANEOUS PRN
Status: DISCONTINUED | OUTPATIENT
Start: 2020-01-01 | End: 2020-01-01 | Stop reason: HOSPADM

## 2020-01-01 RX ORDER — HYDROMORPHONE HYDROCHLORIDE 1 MG/ML
0.5 INJECTION, SOLUTION INTRAMUSCULAR; INTRAVENOUS; SUBCUTANEOUS ONCE
Status: COMPLETED | OUTPATIENT
Start: 2020-01-01 | End: 2020-01-01

## 2020-01-01 RX ORDER — HYDROCODONE BITARTRATE AND ACETAMINOPHEN 5; 325 MG/1; MG/1
1 TABLET ORAL EVERY 6 HOURS PRN
Qty: 20 TABLET | Refills: 0 | Status: SHIPPED | OUTPATIENT
Start: 2020-01-01 | End: 2020-09-05

## 2020-01-01 RX ORDER — FENTANYL CITRATE 50 UG/ML
25 INJECTION, SOLUTION INTRAMUSCULAR; INTRAVENOUS EVERY 4 HOURS PRN
Status: DISCONTINUED | OUTPATIENT
Start: 2020-01-01 | End: 2020-01-01

## 2020-01-01 RX ORDER — ONDANSETRON 2 MG/ML
4 INJECTION INTRAMUSCULAR; INTRAVENOUS ONCE
Status: COMPLETED | OUTPATIENT
Start: 2020-01-01 | End: 2020-01-01

## 2020-01-01 RX ORDER — METRONIDAZOLE 500 MG/1
500 TABLET ORAL 3 TIMES DAILY
Qty: 30 TABLET | Refills: 0 | Status: SHIPPED | OUTPATIENT
Start: 2020-01-01 | End: 2020-09-06

## 2020-01-01 RX ORDER — SODIUM CHLORIDE 0.9 % (FLUSH) 0.9 %
10 SYRINGE (ML) INJECTION ONCE
Status: COMPLETED | OUTPATIENT
Start: 2020-01-01 | End: 2020-01-01

## 2020-01-01 RX ORDER — 0.9 % SODIUM CHLORIDE 0.9 %
80 INTRAVENOUS SOLUTION INTRAVENOUS ONCE
Status: COMPLETED | OUTPATIENT
Start: 2020-01-01 | End: 2020-01-01

## 2020-01-01 RX ORDER — LEVALBUTEROL 1.25 MG/.5ML
1.25 SOLUTION, CONCENTRATE RESPIRATORY (INHALATION) 4 TIMES DAILY
Status: DISCONTINUED | OUTPATIENT
Start: 2020-01-01 | End: 2020-01-01

## 2020-01-01 RX ORDER — MIDAZOLAM HYDROCHLORIDE 1 MG/ML
INJECTION INTRAMUSCULAR; INTRAVENOUS PRN
Status: DISCONTINUED | OUTPATIENT
Start: 2020-01-01 | End: 2020-01-01 | Stop reason: SDUPTHER

## 2020-01-01 RX ORDER — 0.9 % SODIUM CHLORIDE 0.9 %
1000 INTRAVENOUS SOLUTION INTRAVENOUS ONCE
Status: COMPLETED | OUTPATIENT
Start: 2020-01-01 | End: 2020-01-01

## 2020-01-01 RX ORDER — BISACODYL 10 MG
10 SUPPOSITORY, RECTAL RECTAL ONCE
Status: COMPLETED | OUTPATIENT
Start: 2020-01-01 | End: 2020-01-01

## 2020-01-01 RX ORDER — ONDANSETRON 2 MG/ML
4 INJECTION INTRAMUSCULAR; INTRAVENOUS EVERY 6 HOURS PRN
Status: DISCONTINUED | OUTPATIENT
Start: 2020-01-01 | End: 2020-01-01

## 2020-01-01 RX ORDER — SODIUM CHLORIDE, SODIUM LACTATE, POTASSIUM CHLORIDE, CALCIUM CHLORIDE 600; 310; 30; 20 MG/100ML; MG/100ML; MG/100ML; MG/100ML
INJECTION, SOLUTION INTRAVENOUS CONTINUOUS PRN
Status: DISCONTINUED | OUTPATIENT
Start: 2020-01-01 | End: 2020-01-01 | Stop reason: SDUPTHER

## 2020-01-01 RX ORDER — METOPROLOL TARTRATE 5 MG/5ML
5 INJECTION INTRAVENOUS EVERY 6 HOURS PRN
Status: DISCONTINUED | OUTPATIENT
Start: 2020-01-01 | End: 2020-01-01 | Stop reason: HOSPADM

## 2020-01-01 RX ORDER — EPINEPHRINE 1 MG/ML
INJECTION, SOLUTION, CONCENTRATE INTRAVENOUS
Status: COMPLETED | OUTPATIENT
Start: 2020-01-01 | End: 2020-01-01

## 2020-01-01 RX ORDER — FENTANYL CITRATE 50 UG/ML
25 INJECTION, SOLUTION INTRAMUSCULAR; INTRAVENOUS ONCE
Status: COMPLETED | OUTPATIENT
Start: 2020-01-01 | End: 2020-01-01

## 2020-01-01 RX ORDER — 0.9 % SODIUM CHLORIDE 0.9 %
20 INTRAVENOUS SOLUTION INTRAVENOUS ONCE
Status: COMPLETED | OUTPATIENT
Start: 2020-01-01 | End: 2020-01-01

## 2020-01-01 RX ORDER — 0.9 % SODIUM CHLORIDE 0.9 %
20 INTRAVENOUS SOLUTION INTRAVENOUS ONCE
Status: DISCONTINUED | OUTPATIENT
Start: 2020-01-01 | End: 2020-01-01 | Stop reason: HOSPADM

## 2020-01-01 RX ORDER — HYDROMORPHONE HCL 110MG/55ML
0.5 PATIENT CONTROLLED ANALGESIA SYRINGE INTRAVENOUS EVERY 5 MIN PRN
Status: DISCONTINUED | OUTPATIENT
Start: 2020-01-01 | End: 2020-01-01 | Stop reason: HOSPADM

## 2020-01-01 RX ORDER — BISACODYL 10 MG
10 SUPPOSITORY, RECTAL RECTAL ONCE
Status: DISCONTINUED | OUTPATIENT
Start: 2020-01-01 | End: 2020-01-01

## 2020-01-01 RX ORDER — CHLORHEXIDINE GLUCONATE 0.12 MG/ML
15 RINSE ORAL 2 TIMES DAILY
Status: DISCONTINUED | OUTPATIENT
Start: 2020-01-01 | End: 2020-01-01 | Stop reason: HOSPADM

## 2020-01-01 RX ORDER — PROPOFOL 10 MG/ML
INJECTION, EMULSION INTRAVENOUS PRN
Status: DISCONTINUED | OUTPATIENT
Start: 2020-01-01 | End: 2020-01-01 | Stop reason: SDUPTHER

## 2020-01-01 RX ORDER — SODIUM CHLORIDE 0.9 % (FLUSH) 0.9 %
10 SYRINGE (ML) INJECTION PRN
Status: DISCONTINUED | OUTPATIENT
Start: 2020-01-01 | End: 2020-01-01 | Stop reason: HOSPADM

## 2020-01-01 RX ORDER — CIPROFLOXACIN 500 MG/1
500 TABLET, FILM COATED ORAL 2 TIMES DAILY
Qty: 20 TABLET | Refills: 0 | Status: SHIPPED | OUTPATIENT
Start: 2020-01-01 | End: 2020-09-06

## 2020-01-01 RX ORDER — 0.9 % SODIUM CHLORIDE 0.9 %
500 INTRAVENOUS SOLUTION INTRAVENOUS ONCE
Status: COMPLETED | OUTPATIENT
Start: 2020-01-01 | End: 2020-01-01

## 2020-01-01 RX ORDER — TOPIRAMATE 100 MG/1
TABLET, FILM COATED ORAL
Qty: 60 TABLET | Refills: 11 | Status: SHIPPED | OUTPATIENT
Start: 2020-01-01

## 2020-01-01 RX ORDER — POTASSIUM CHLORIDE AND SODIUM CHLORIDE 900; 300 MG/100ML; MG/100ML
INJECTION, SOLUTION INTRAVENOUS CONTINUOUS
Status: DISCONTINUED | OUTPATIENT
Start: 2020-01-01 | End: 2020-01-01

## 2020-01-01 RX ORDER — SODIUM CHLORIDE 0.9 % (FLUSH) 0.9 %
10 SYRINGE (ML) INJECTION PRN
Status: DISCONTINUED | OUTPATIENT
Start: 2020-01-01 | End: 2020-01-01 | Stop reason: SDUPTHER

## 2020-01-01 RX ORDER — ONDANSETRON 4 MG/1
4 TABLET, FILM COATED ORAL 3 TIMES DAILY PRN
Qty: 15 TABLET | Refills: 0 | Status: SHIPPED | OUTPATIENT
Start: 2020-01-01

## 2020-01-01 RX ORDER — DOCUSATE SODIUM 100 MG/1
100 CAPSULE, LIQUID FILLED ORAL 2 TIMES DAILY PRN
Qty: 60 CAPSULE | Refills: 0 | Status: SHIPPED | OUTPATIENT
Start: 2020-01-01 | End: 2020-09-30

## 2020-01-01 RX ORDER — FENTANYL CITRATE 50 UG/ML
INJECTION, SOLUTION INTRAMUSCULAR; INTRAVENOUS PRN
Status: DISCONTINUED | OUTPATIENT
Start: 2020-01-01 | End: 2020-01-01 | Stop reason: SDUPTHER

## 2020-01-01 RX ORDER — HEPARIN SODIUM 5000 [USP'U]/ML
80 INJECTION, SOLUTION INTRAVENOUS; SUBCUTANEOUS ONCE
Status: COMPLETED | OUTPATIENT
Start: 2020-01-01 | End: 2020-01-01

## 2020-01-01 RX ORDER — PHENYLEPHRINE HCL IN 0.9% NACL 1 MG/10 ML
SYRINGE (ML) INTRAVENOUS PRN
Status: DISCONTINUED | OUTPATIENT
Start: 2020-01-01 | End: 2020-01-01 | Stop reason: SDUPTHER

## 2020-01-01 RX ORDER — MORPHINE SULFATE 2 MG/ML
2 INJECTION, SOLUTION INTRAMUSCULAR; INTRAVENOUS EVERY 4 HOURS PRN
Status: DISCONTINUED | OUTPATIENT
Start: 2020-01-01 | End: 2020-01-01

## 2020-01-01 RX ORDER — SENNA PLUS 8.6 MG/1
1 TABLET ORAL 2 TIMES DAILY
Status: DISCONTINUED | OUTPATIENT
Start: 2020-01-01 | End: 2020-01-01 | Stop reason: HOSPADM

## 2020-01-01 RX ORDER — MIDAZOLAM HYDROCHLORIDE 1 MG/ML
INJECTION INTRAMUSCULAR; INTRAVENOUS
Status: COMPLETED
Start: 2020-01-01 | End: 2020-01-01

## 2020-01-01 RX ORDER — ONDANSETRON 4 MG/1
4 TABLET, ORALLY DISINTEGRATING ORAL EVERY 6 HOURS PRN
Qty: 20 TABLET | Refills: 0 | Status: SHIPPED | OUTPATIENT
Start: 2020-01-01

## 2020-01-01 RX ORDER — TAMSULOSIN HYDROCHLORIDE 0.4 MG/1
0.4 CAPSULE ORAL DAILY
Status: DISCONTINUED | OUTPATIENT
Start: 2020-01-01 | End: 2020-01-01 | Stop reason: HOSPADM

## 2020-01-01 RX ORDER — ONDANSETRON 2 MG/ML
4 INJECTION INTRAMUSCULAR; INTRAVENOUS
Status: DISCONTINUED | OUTPATIENT
Start: 2020-01-01 | End: 2020-01-01 | Stop reason: HOSPADM

## 2020-01-01 RX ORDER — SUCCINYLCHOLINE/SOD CL,ISO/PF 100 MG/5ML
SYRINGE (ML) INTRAVENOUS PRN
Status: DISCONTINUED | OUTPATIENT
Start: 2020-01-01 | End: 2020-01-01 | Stop reason: SDUPTHER

## 2020-01-01 RX ORDER — HYDROMORPHONE HCL 110MG/55ML
0.25 PATIENT CONTROLLED ANALGESIA SYRINGE INTRAVENOUS EVERY 5 MIN PRN
Status: DISCONTINUED | OUTPATIENT
Start: 2020-01-01 | End: 2020-01-01 | Stop reason: HOSPADM

## 2020-01-01 RX ORDER — SODIUM CHLORIDE 0.9 % (FLUSH) 0.9 %
10 SYRINGE (ML) INJECTION EVERY 12 HOURS SCHEDULED
Status: DISCONTINUED | OUTPATIENT
Start: 2020-01-01 | End: 2020-01-01 | Stop reason: HOSPADM

## 2020-01-01 RX ORDER — TOPIRAMATE 100 MG/1
TABLET, FILM COATED ORAL
Qty: 60 TABLET | Refills: 0 | Status: SHIPPED | OUTPATIENT
Start: 2020-01-01 | End: 2020-01-01 | Stop reason: SDUPTHER

## 2020-01-01 RX ORDER — PANTOPRAZOLE SODIUM 40 MG/10ML
40 INJECTION, POWDER, LYOPHILIZED, FOR SOLUTION INTRAVENOUS DAILY
Status: DISCONTINUED | OUTPATIENT
Start: 2020-01-01 | End: 2020-01-01 | Stop reason: HOSPADM

## 2020-01-01 RX ORDER — FENTANYL CITRATE 50 UG/ML
25 INJECTION, SOLUTION INTRAMUSCULAR; INTRAVENOUS EVERY 5 MIN PRN
Status: DISCONTINUED | OUTPATIENT
Start: 2020-01-01 | End: 2020-01-01 | Stop reason: HOSPADM

## 2020-01-01 RX ORDER — ONDANSETRON 4 MG/1
4 TABLET, ORALLY DISINTEGRATING ORAL EVERY 6 HOURS PRN
Status: DISCONTINUED | OUTPATIENT
Start: 2020-01-01 | End: 2020-01-01 | Stop reason: HOSPADM

## 2020-01-01 RX ORDER — ONDANSETRON 2 MG/ML
INJECTION INTRAMUSCULAR; INTRAVENOUS PRN
Status: DISCONTINUED | OUTPATIENT
Start: 2020-01-01 | End: 2020-01-01 | Stop reason: SDUPTHER

## 2020-01-01 RX ORDER — FENTANYL CITRATE 50 UG/ML
50 INJECTION, SOLUTION INTRAMUSCULAR; INTRAVENOUS EVERY 5 MIN PRN
Status: DISCONTINUED | OUTPATIENT
Start: 2020-01-01 | End: 2020-01-01 | Stop reason: HOSPADM

## 2020-01-01 RX ORDER — HYDROCODONE BITARTRATE AND ACETAMINOPHEN 5; 325 MG/1; MG/1
1 TABLET ORAL EVERY 4 HOURS PRN
Status: DISCONTINUED | OUTPATIENT
Start: 2020-01-01 | End: 2020-01-01 | Stop reason: HOSPADM

## 2020-01-01 RX ORDER — CYCLOBENZAPRINE HCL 10 MG
5 TABLET ORAL 3 TIMES DAILY
Status: DISCONTINUED | OUTPATIENT
Start: 2020-01-01 | End: 2020-01-01 | Stop reason: HOSPADM

## 2020-01-01 RX ADMIN — SODIUM CHLORIDE 80 ML: 9 INJECTION, SOLUTION INTRAVENOUS at 11:19

## 2020-01-01 RX ADMIN — SENNOSIDES 8.6 MG: 8.6 TABLET, FILM COATED ORAL at 08:23

## 2020-01-01 RX ADMIN — Medication 10 ML: at 17:13

## 2020-01-01 RX ADMIN — SODIUM CHLORIDE 500 ML: 9 INJECTION, SOLUTION INTRAVENOUS at 14:30

## 2020-01-01 RX ADMIN — PHENYLEPHRINE HYDROCHLORIDE 300 MCG/MIN: 10 INJECTION INTRAVENOUS at 03:17

## 2020-01-01 RX ADMIN — FENTANYL CITRATE 50 MCG: 50 INJECTION, SOLUTION INTRAMUSCULAR; INTRAVENOUS at 16:20

## 2020-01-01 RX ADMIN — HYDROCODONE BITARTRATE AND ACETAMINOPHEN 1 TABLET: 5; 325 TABLET ORAL at 08:22

## 2020-01-01 RX ADMIN — Medication 150 MEQ: at 16:00

## 2020-01-01 RX ADMIN — HYDROCODONE BITARTRATE AND ACETAMINOPHEN 1 TABLET: 5; 325 TABLET ORAL at 23:17

## 2020-01-01 RX ADMIN — PHENYLEPHRINE HYDROCHLORIDE 50 MCG/MIN: 10 INJECTION, SOLUTION INTRAMUSCULAR; INTRAVENOUS; SUBCUTANEOUS at 15:32

## 2020-01-01 RX ADMIN — EPINEPHRINE 2 MCG/MIN: 1 INJECTION PARENTERAL at 15:14

## 2020-01-01 RX ADMIN — Medication 150 MEQ: at 00:30

## 2020-01-01 RX ADMIN — EPINEPHRINE 30 MCG/MIN: 1 INJECTION INTRAMUSCULAR; INTRAVENOUS; SUBCUTANEOUS at 00:30

## 2020-01-01 RX ADMIN — SODIUM CHLORIDE 1000 ML: 9 INJECTION, SOLUTION INTRAVENOUS at 10:56

## 2020-01-01 RX ADMIN — SODIUM CHLORIDE, POTASSIUM CHLORIDE, SODIUM LACTATE AND CALCIUM CHLORIDE: 600; 310; 30; 20 INJECTION, SOLUTION INTRAVENOUS at 16:00

## 2020-01-01 RX ADMIN — Medication 10 ML: at 09:54

## 2020-01-01 RX ADMIN — EPINEPHRINE 2 MCG/MIN: 1 INJECTION PARENTERAL at 15:20

## 2020-01-01 RX ADMIN — PHENYLEPHRINE HYDROCHLORIDE 300 MCG/MIN: 10 INJECTION INTRAVENOUS at 01:45

## 2020-01-01 RX ADMIN — IOPAMIDOL 75 ML: 755 INJECTION, SOLUTION INTRAVENOUS at 11:19

## 2020-01-01 RX ADMIN — POTASSIUM CHLORIDE AND SODIUM CHLORIDE: 900; 300 INJECTION, SOLUTION INTRAVENOUS at 04:22

## 2020-01-01 RX ADMIN — SODIUM CHLORIDE, POTASSIUM CHLORIDE, SODIUM LACTATE AND CALCIUM CHLORIDE: 600; 310; 30; 20 INJECTION, SOLUTION INTRAVENOUS at 14:30

## 2020-01-01 RX ADMIN — Medication 150 MEQ: at 22:23

## 2020-01-01 RX ADMIN — CYCLOBENZAPRINE 5 MG: 10 TABLET, FILM COATED ORAL at 08:24

## 2020-01-01 RX ADMIN — LIDOCAINE HYDROCHLORIDE 80 MG: 20 INJECTION, SOLUTION EPIDURAL; INFILTRATION; INTRACAUDAL; PERINEURAL at 14:37

## 2020-01-01 RX ADMIN — ONDANSETRON 4 MG: 2 INJECTION INTRAMUSCULAR; INTRAVENOUS at 10:56

## 2020-01-01 RX ADMIN — CYCLOBENZAPRINE 5 MG: 10 TABLET, FILM COATED ORAL at 09:48

## 2020-01-01 RX ADMIN — HYDROMORPHONE HYDROCHLORIDE 0.5 MG: 1 INJECTION, SOLUTION INTRAMUSCULAR; INTRAVENOUS; SUBCUTANEOUS at 12:28

## 2020-01-01 RX ADMIN — Medication 10 ML: at 12:45

## 2020-01-01 RX ADMIN — Medication 2 MG/HR: at 16:03

## 2020-01-01 RX ADMIN — HEPARIN SODIUM AND DEXTROSE 16.05 UNITS/KG/HR: 10000; 5 INJECTION INTRAVENOUS at 14:53

## 2020-01-01 RX ADMIN — VASOPRESSIN 0.04 UNITS/MIN: 20 INJECTION INTRAVENOUS at 17:00

## 2020-01-01 RX ADMIN — Medication 100 MEQ: at 02:34

## 2020-01-01 RX ADMIN — PIPERACILLIN AND TAZOBACTAM 3.38 G: 3; .375 INJECTION, POWDER, LYOPHILIZED, FOR SOLUTION INTRAVENOUS at 20:30

## 2020-01-01 RX ADMIN — PHENYLEPHRINE HYDROCHLORIDE 300 MCG/MIN: 10 INJECTION INTRAVENOUS at 22:14

## 2020-01-01 RX ADMIN — MIDAZOLAM 2 MG: 1 INJECTION INTRAMUSCULAR; INTRAVENOUS at 14:30

## 2020-01-01 RX ADMIN — CYCLOBENZAPRINE 5 MG: 10 TABLET, FILM COATED ORAL at 10:21

## 2020-01-01 RX ADMIN — SODIUM CHLORIDE 20 ML: 9 INJECTION, SOLUTION INTRAVENOUS at 22:25

## 2020-01-01 RX ADMIN — POTASSIUM CHLORIDE AND SODIUM CHLORIDE: 900; 300 INJECTION, SOLUTION INTRAVENOUS at 15:17

## 2020-01-01 RX ADMIN — Medication 10 ML: at 19:18

## 2020-01-01 RX ADMIN — SENNOSIDES 8.6 MG: 8.6 TABLET, FILM COATED ORAL at 10:21

## 2020-01-01 RX ADMIN — IPRATROPIUM BROMIDE 0.5 MG: 0.5 SOLUTION RESPIRATORY (INHALATION) at 19:25

## 2020-01-01 RX ADMIN — IPRATROPIUM BROMIDE 0.5 MG: 0.5 SOLUTION RESPIRATORY (INHALATION) at 11:34

## 2020-01-01 RX ADMIN — PHENYLEPHRINE HYDROCHLORIDE 150 MCG/MIN: 10 INJECTION INTRAVENOUS at 15:30

## 2020-01-01 RX ADMIN — Medication 10 ML: at 15:20

## 2020-01-01 RX ADMIN — FENTANYL CITRATE 50 MCG: 50 INJECTION, SOLUTION INTRAMUSCULAR; INTRAVENOUS at 14:37

## 2020-01-01 RX ADMIN — Medication 100 MG: at 14:38

## 2020-01-01 RX ADMIN — Medication 150 MEQ: at 00:20

## 2020-01-01 RX ADMIN — VASOPRESSIN 0.06 UNITS/MIN: 20 INJECTION INTRAVENOUS at 03:18

## 2020-01-01 RX ADMIN — TAMSULOSIN HYDROCHLORIDE 0.4 MG: 0.4 CAPSULE ORAL at 10:30

## 2020-01-01 RX ADMIN — PIPERACILLIN AND TAZOBACTAM 3.38 G: 3; .375 INJECTION, POWDER, LYOPHILIZED, FOR SOLUTION INTRAVENOUS at 04:20

## 2020-01-01 RX ADMIN — PIPERACILLIN AND TAZOBACTAM 3.38 G: 3; .375 INJECTION, POWDER, LYOPHILIZED, FOR SOLUTION INTRAVENOUS at 11:18

## 2020-01-01 RX ADMIN — POTASSIUM CHLORIDE AND SODIUM CHLORIDE: 900; 300 INJECTION, SOLUTION INTRAVENOUS at 01:35

## 2020-01-01 RX ADMIN — DEXTROSE MONOHYDRATE 40 MCG/MIN: 50 INJECTION, SOLUTION INTRAVENOUS at 21:45

## 2020-01-01 RX ADMIN — DEXTROSE MONOHYDRATE 40 MCG/MIN: 50 INJECTION, SOLUTION INTRAVENOUS at 03:18

## 2020-01-01 RX ADMIN — PROPOFOL 160 MG: 10 INJECTION, EMULSION INTRAVENOUS at 14:37

## 2020-01-01 RX ADMIN — FENTANYL CITRATE 25 MCG: 50 INJECTION, SOLUTION INTRAMUSCULAR; INTRAVENOUS at 04:32

## 2020-01-01 RX ADMIN — EPINEPHRINE 1 MG: 1 INJECTION, SOLUTION INTRAMUSCULAR; SUBCUTANEOUS at 14:58

## 2020-01-01 RX ADMIN — IPRATROPIUM BROMIDE 0.5 MG: 0.5 SOLUTION RESPIRATORY (INHALATION) at 07:51

## 2020-01-01 RX ADMIN — SODIUM BICARBONATE 50 MEQ: 84 INJECTION, SOLUTION INTRAVENOUS at 15:05

## 2020-01-01 RX ADMIN — LEVALBUTEROL 1.25 MG: 1.25 SOLUTION, CONCENTRATE RESPIRATORY (INHALATION) at 19:25

## 2020-01-01 RX ADMIN — IOPAMIDOL 75 ML: 755 INJECTION, SOLUTION INTRAVENOUS at 15:19

## 2020-01-01 RX ADMIN — IOPAMIDOL 75 ML: 755 INJECTION, SOLUTION INTRAVENOUS at 17:12

## 2020-01-01 RX ADMIN — MIDAZOLAM HYDROCHLORIDE 2 MG: 2 INJECTION, SOLUTION INTRAMUSCULAR; INTRAVENOUS at 15:44

## 2020-01-01 RX ADMIN — EPINEPHRINE 2 MCG/MIN: 1 INJECTION INTRAMUSCULAR; INTRAVENOUS; SUBCUTANEOUS at 21:39

## 2020-01-01 RX ADMIN — EPINEPHRINE 1 MG: 1 INJECTION, SOLUTION INTRAMUSCULAR; SUBCUTANEOUS at 15:01

## 2020-01-01 RX ADMIN — NOREPINEPHRINE BITARTRATE 20 MCG/MIN: 1 INJECTION, SOLUTION, CONCENTRATE INTRAVENOUS at 15:31

## 2020-01-01 RX ADMIN — SODIUM CHLORIDE 80 ML: 9 INJECTION, SOLUTION INTRAVENOUS at 15:19

## 2020-01-01 RX ADMIN — Medication: at 16:15

## 2020-01-01 RX ADMIN — EPINEPHRINE 1 MG: 1 INJECTION, SOLUTION INTRAMUSCULAR; SUBCUTANEOUS at 15:19

## 2020-01-01 RX ADMIN — CYCLOBENZAPRINE 5 MG: 10 TABLET, FILM COATED ORAL at 21:00

## 2020-01-01 RX ADMIN — PROTAMINE SULFATE 30 MG: 10 INJECTION, SOLUTION INTRAVENOUS at 18:10

## 2020-01-01 RX ADMIN — SODIUM CHLORIDE 100 ML/HR: 9 INJECTION, SOLUTION INTRAVENOUS at 13:57

## 2020-01-01 RX ADMIN — CYCLOBENZAPRINE 5 MG: 10 TABLET, FILM COATED ORAL at 19:17

## 2020-01-01 RX ADMIN — SUGAMMADEX 200 MG: 100 INJECTION, SOLUTION INTRAVENOUS at 16:20

## 2020-01-01 RX ADMIN — PIPERACILLIN AND TAZOBACTAM 3.38 G: 3; .375 INJECTION, POWDER, LYOPHILIZED, FOR SOLUTION INTRAVENOUS at 11:49

## 2020-01-01 RX ADMIN — DEXAMETHASONE SODIUM PHOSPHATE 10 MG: 10 INJECTION INTRAMUSCULAR; INTRAVENOUS at 14:46

## 2020-01-01 RX ADMIN — HEPARIN SODIUM AND DEXTROSE 18 UNITS/KG/HR: 10000; 5 INJECTION INTRAVENOUS at 17:59

## 2020-01-01 RX ADMIN — ALTEPLASE 50 MG: KIT at 15:07

## 2020-01-01 RX ADMIN — FENTANYL CITRATE 100 MCG: 50 INJECTION, SOLUTION INTRAMUSCULAR; INTRAVENOUS at 14:57

## 2020-01-01 RX ADMIN — Medication 100 MEQ: at 20:00

## 2020-01-01 RX ADMIN — Medication 200 MCG: at 14:42

## 2020-01-01 RX ADMIN — EPINEPHRINE 30 MCG/MIN: 1 INJECTION INTRAMUSCULAR; INTRAVENOUS; SUBCUTANEOUS at 05:32

## 2020-01-01 RX ADMIN — SODIUM CHLORIDE: 9 INJECTION, SOLUTION INTRAVENOUS at 18:44

## 2020-01-01 RX ADMIN — PIPERACILLIN AND TAZOBACTAM 3.38 G: 3; .375 INJECTION, POWDER, LYOPHILIZED, FOR SOLUTION INTRAVENOUS at 21:01

## 2020-01-01 RX ADMIN — ONDANSETRON 4 MG: 2 INJECTION, SOLUTION INTRAMUSCULAR; INTRAVENOUS at 14:48

## 2020-01-01 RX ADMIN — PIPERACILLIN AND TAZOBACTAM 3.38 G: 3; .375 INJECTION, POWDER, LYOPHILIZED, FOR SOLUTION INTRAVENOUS at 22:16

## 2020-01-01 RX ADMIN — BISACODYL 10 MG: 10 SUPPOSITORY RECTAL at 08:27

## 2020-01-01 RX ADMIN — LEVALBUTEROL 1.25 MG: 1.25 SOLUTION, CONCENTRATE RESPIRATORY (INHALATION) at 11:34

## 2020-01-01 RX ADMIN — Medication 10 ML: at 11:19

## 2020-01-01 RX ADMIN — PIPERACILLIN AND TAZOBACTAM 3.38 G: 3; .375 INJECTION, POWDER, LYOPHILIZED, FOR SOLUTION INTRAVENOUS at 20:08

## 2020-01-01 RX ADMIN — CYCLOBENZAPRINE 5 MG: 10 TABLET, FILM COATED ORAL at 13:52

## 2020-01-01 RX ADMIN — POTASSIUM CHLORIDE AND SODIUM CHLORIDE: 900; 300 INJECTION, SOLUTION INTRAVENOUS at 10:52

## 2020-01-01 RX ADMIN — ROCURONIUM BROMIDE 50 MG: 10 INJECTION, SOLUTION INTRAVENOUS at 14:49

## 2020-01-01 RX ADMIN — PIPERACILLIN AND TAZOBACTAM 3.38 G: 3; .375 INJECTION, POWDER, LYOPHILIZED, FOR SOLUTION INTRAVENOUS at 13:04

## 2020-01-01 RX ADMIN — PIPERACILLIN AND TAZOBACTAM 3.38 G: 3; .375 INJECTION, POWDER, LYOPHILIZED, FOR SOLUTION INTRAVENOUS at 04:50

## 2020-01-01 RX ADMIN — SODIUM CHLORIDE: 9 INJECTION, SOLUTION INTRAVENOUS at 06:28

## 2020-01-01 RX ADMIN — PIPERACILLIN AND TAZOBACTAM 3.38 G: 3; .375 INJECTION, POWDER, LYOPHILIZED, FOR SOLUTION INTRAVENOUS at 04:11

## 2020-01-01 RX ADMIN — SODIUM CHLORIDE: 9 INJECTION, SOLUTION INTRAVENOUS at 16:36

## 2020-01-01 RX ADMIN — CHLORHEXIDINE GLUCONATE 15 ML: 1.2 RINSE ORAL at 22:25

## 2020-01-01 RX ADMIN — LEVALBUTEROL 1.25 MG: 1.25 SOLUTION, CONCENTRATE RESPIRATORY (INHALATION) at 07:51

## 2020-01-01 RX ADMIN — PANTOPRAZOLE SODIUM 40 MG: 40 INJECTION, POWDER, FOR SOLUTION INTRAVENOUS at 10:21

## 2020-01-01 RX ADMIN — SODIUM BICARBONATE 50 MEQ: 84 INJECTION, SOLUTION INTRAVENOUS at 15:17

## 2020-01-01 RX ADMIN — SENNOSIDES 8.6 MG: 8.6 TABLET, FILM COATED ORAL at 19:18

## 2020-01-01 RX ADMIN — METOPROLOL TARTRATE 5 MG: 5 INJECTION INTRAVENOUS at 19:22

## 2020-01-01 RX ADMIN — METOPROLOL TARTRATE 5 MG: 5 INJECTION INTRAVENOUS at 08:15

## 2020-01-01 RX ADMIN — POTASSIUM CHLORIDE AND SODIUM CHLORIDE: 900; 300 INJECTION, SOLUTION INTRAVENOUS at 13:06

## 2020-01-01 RX ADMIN — FENTANYL CITRATE 50 MCG: 50 INJECTION, SOLUTION INTRAMUSCULAR; INTRAVENOUS at 16:31

## 2020-01-01 RX ADMIN — CYCLOBENZAPRINE 5 MG: 10 TABLET, FILM COATED ORAL at 13:58

## 2020-01-01 RX ADMIN — EPINEPHRINE 1 MG: 1 INJECTION, SOLUTION INTRAMUSCULAR; SUBCUTANEOUS at 15:09

## 2020-01-01 RX ADMIN — FENTANYL CITRATE 25 MCG: 50 INJECTION, SOLUTION INTRAMUSCULAR; INTRAVENOUS at 10:56

## 2020-01-01 RX ADMIN — PHENYLEPHRINE HYDROCHLORIDE 300 MCG/MIN: 10 INJECTION INTRAVENOUS at 18:47

## 2020-01-01 RX ADMIN — HEPARIN SODIUM 6550 UNITS: 5000 INJECTION INTRAVENOUS; SUBCUTANEOUS at 18:11

## 2020-01-01 RX ADMIN — SODIUM CHLORIDE 1000 ML: 9 INJECTION, SOLUTION INTRAVENOUS at 12:29

## 2020-01-01 RX ADMIN — Medication: at 05:30

## 2020-01-01 RX ADMIN — PIPERACILLIN SODIUM AND TAZOBACTAM SODIUM 4.5 G: 4; .5 INJECTION, POWDER, LYOPHILIZED, FOR SOLUTION INTRAVENOUS at 13:57

## 2020-01-01 RX ADMIN — SODIUM CHLORIDE 80 ML: 9 INJECTION, SOLUTION INTRAVENOUS at 17:13

## 2020-01-01 RX ADMIN — SODIUM CHLORIDE: 9 INJECTION, SOLUTION INTRAVENOUS at 09:45

## 2020-01-01 RX ADMIN — SODIUM CHLORIDE: 9 INJECTION, SOLUTION INTRAVENOUS at 00:49

## 2020-01-01 RX ADMIN — ALTEPLASE 50 MG: KIT at 15:30

## 2020-01-01 RX ADMIN — VASOPRESSIN 0.06 UNITS/MIN: 20 INJECTION INTRAVENOUS at 22:12

## 2020-01-01 ASSESSMENT — PAIN DESCRIPTION - PROGRESSION

## 2020-01-01 ASSESSMENT — ENCOUNTER SYMPTOMS
VOMITING: 0
COUGH: 0
CHEST TIGHTNESS: 0
ABDOMINAL DISTENTION: 0
BACK PAIN: 0
SORE THROAT: 0
EYE PAIN: 0
SORE THROAT: 0
COLOR CHANGE: 0
EYE DISCHARGE: 0
SHORTNESS OF BREATH: 0
NAUSEA: 0
NAUSEA: 0
RHINORRHEA: 0
VOMITING: 0
DIARRHEA: 0
VOMITING: 1
EYE PAIN: 0
CHEST TIGHTNESS: 0
DIARRHEA: 1
DIARRHEA: 0
EYE DISCHARGE: 0
SINUS PRESSURE: 0
ABDOMINAL PAIN: 0
SINUS PRESSURE: 0
SHORTNESS OF BREATH: 1
COUGH: 0
VOMITING: 1
EYE REDNESS: 0
NAUSEA: 1
COUGH: 0
CHEST TIGHTNESS: 0
EYE PAIN: 0
ABDOMINAL DISTENTION: 0
SINUS PRESSURE: 0
VOMITING: 0
VOICE CHANGE: 0
SORE THROAT: 0
SHORTNESS OF BREATH: 0
COLOR CHANGE: 0
SHORTNESS OF BREATH: 0
SHORTNESS OF BREATH: 0
NAUSEA: 0
SINUS PRESSURE: 0
BACK PAIN: 0
COUGH: 0
DIARRHEA: 1
RHINORRHEA: 0
RHINORRHEA: 0
EYE DISCHARGE: 0
SHORTNESS OF BREATH: 0
CHEST TIGHTNESS: 0
CONSTIPATION: 0
COLOR CHANGE: 0
DIARRHEA: 0
NAUSEA: 1
VOICE CHANGE: 0
CONSTIPATION: 0
SHORTNESS OF BREATH: 0
COLOR CHANGE: 0
SORE THROAT: 0
ABDOMINAL DISTENTION: 0
NAUSEA: 0
DIARRHEA: 0
NAUSEA: 0
SHORTNESS OF BREATH: 0
BACK PAIN: 0
CONSTIPATION: 0
EYE DISCHARGE: 0
ABDOMINAL DISTENTION: 1
DIARRHEA: 0
NAUSEA: 0
DIARRHEA: 0
EYE PAIN: 0
VOMITING: 0
VOMITING: 0
ABDOMINAL PAIN: 0
VOMITING: 0
CONSTIPATION: 0
VOICE CHANGE: 0
ABDOMINAL PAIN: 1
VOICE CHANGE: 0
ABDOMINAL PAIN: 1
VOMITING: 0
BACK PAIN: 0
NAUSEA: 0
RHINORRHEA: 0
SHORTNESS OF BREATH: 0

## 2020-01-01 ASSESSMENT — PAIN DESCRIPTION - ONSET
ONSET: GRADUAL
ONSET: ON-GOING
ONSET: GRADUAL

## 2020-01-01 ASSESSMENT — PAIN DESCRIPTION - FREQUENCY
FREQUENCY: INTERMITTENT
FREQUENCY: CONTINUOUS
FREQUENCY: INTERMITTENT

## 2020-01-01 ASSESSMENT — PULMONARY FUNCTION TESTS
PIF_VALUE: 28
PIF_VALUE: 18
PIF_VALUE: 15
PIF_VALUE: 14
PIF_VALUE: 18
PIF_VALUE: 28
PIF_VALUE: 15
PIF_VALUE: 12
PIF_VALUE: 18
PIF_VALUE: 1
PIF_VALUE: 2
PIF_VALUE: 28
PIF_VALUE: 28
PIF_VALUE: 29
PIF_VALUE: 28
PIF_VALUE: 18
PIF_VALUE: 3
PIF_VALUE: 2
PIF_VALUE: 28
PIF_VALUE: 28
PIF_VALUE: 1
PIF_VALUE: 19
PIF_VALUE: 3
PIF_VALUE: 27
PIF_VALUE: 29
PIF_VALUE: 28
PIF_VALUE: 29
PIF_VALUE: 29
PIF_VALUE: 27
PIF_VALUE: 28
PIF_VALUE: 12
PIF_VALUE: 2
PIF_VALUE: 18
PIF_VALUE: 2
PIF_VALUE: 18
PIF_VALUE: 28
PIF_VALUE: 29
PIF_VALUE: 18
PIF_VALUE: 29
PIF_VALUE: 18
PIF_VALUE: 29
PIF_VALUE: 26
PIF_VALUE: 26
PIF_VALUE: 27
PIF_VALUE: 25
PIF_VALUE: 19
PIF_VALUE: 19
PIF_VALUE: 3
PIF_VALUE: 28
PIF_VALUE: 18
PIF_VALUE: 27
PIF_VALUE: 2
PIF_VALUE: 28
PIF_VALUE: 25
PIF_VALUE: 27
PIF_VALUE: 27
PIF_VALUE: 26
PIF_VALUE: 26
PIF_VALUE: 28
PIF_VALUE: 18
PIF_VALUE: 28
PIF_VALUE: 28
PIF_VALUE: 25
PIF_VALUE: 28
PIF_VALUE: 55
PIF_VALUE: 29
PIF_VALUE: 19
PIF_VALUE: 18
PIF_VALUE: 29
PIF_VALUE: 19
PIF_VALUE: 28
PIF_VALUE: 18
PIF_VALUE: 26
PIF_VALUE: 0
PIF_VALUE: 24
PIF_VALUE: 12
PIF_VALUE: 26
PIF_VALUE: 15
PIF_VALUE: 29
PIF_VALUE: 17
PIF_VALUE: 19
PIF_VALUE: 28
PIF_VALUE: 26
PIF_VALUE: 19
PIF_VALUE: 28
PIF_VALUE: 3
PIF_VALUE: 19
PIF_VALUE: 18
PIF_VALUE: 1
PIF_VALUE: 15
PIF_VALUE: 31
PIF_VALUE: 38
PIF_VALUE: 15
PIF_VALUE: 3
PIF_VALUE: 0
PIF_VALUE: 29
PIF_VALUE: 28
PIF_VALUE: 0
PIF_VALUE: 28
PIF_VALUE: 25
PIF_VALUE: 26
PIF_VALUE: 14
PIF_VALUE: 25
PIF_VALUE: 27
PIF_VALUE: 28
PIF_VALUE: 18
PIF_VALUE: 29
PIF_VALUE: 18
PIF_VALUE: 2
PIF_VALUE: 28
PIF_VALUE: 18
PIF_VALUE: 29
PIF_VALUE: 26
PIF_VALUE: 28
PIF_VALUE: 28
PIF_VALUE: 27
PIF_VALUE: 0
PIF_VALUE: 2
PIF_VALUE: 28
PIF_VALUE: 28
PIF_VALUE: 26
PIF_VALUE: 12
PIF_VALUE: 2
PIF_VALUE: 0
PIF_VALUE: 15
PIF_VALUE: 15
PIF_VALUE: 53
PIF_VALUE: 15
PIF_VALUE: 28
PIF_VALUE: 26

## 2020-01-01 ASSESSMENT — PAIN SCALES - WONG BAKER
WONGBAKER_NUMERICALRESPONSE: 2

## 2020-01-01 ASSESSMENT — PATIENT HEALTH QUESTIONNAIRE - PHQ9
SUM OF ALL RESPONSES TO PHQ QUESTIONS 1-9: 0
1. LITTLE INTEREST OR PLEASURE IN DOING THINGS: 0
2. FEELING DOWN, DEPRESSED OR HOPELESS: 0
2. FEELING DOWN, DEPRESSED OR HOPELESS: 0
SUM OF ALL RESPONSES TO PHQ9 QUESTIONS 1 & 2: 0
SUM OF ALL RESPONSES TO PHQ9 QUESTIONS 1 & 2: 0
SUM OF ALL RESPONSES TO PHQ QUESTIONS 1-9: 0
SUM OF ALL RESPONSES TO PHQ QUESTIONS 1-9: 0
1. LITTLE INTEREST OR PLEASURE IN DOING THINGS: 0
SUM OF ALL RESPONSES TO PHQ QUESTIONS 1-9: 0
2. FEELING DOWN, DEPRESSED OR HOPELESS: 0
SUM OF ALL RESPONSES TO PHQ QUESTIONS 1-9: 0
SUM OF ALL RESPONSES TO PHQ QUESTIONS 1-9: 0
SUM OF ALL RESPONSES TO PHQ9 QUESTIONS 1 & 2: 0
1. LITTLE INTEREST OR PLEASURE IN DOING THINGS: 0

## 2020-01-01 ASSESSMENT — PAIN DESCRIPTION - LOCATION
LOCATION: ABDOMEN

## 2020-01-01 ASSESSMENT — PAIN DESCRIPTION - ORIENTATION
ORIENTATION: MID;LOWER
ORIENTATION: LOWER
ORIENTATION: LOWER
ORIENTATION: RIGHT;LOWER

## 2020-01-01 ASSESSMENT — PAIN SCALES - GENERAL
PAINLEVEL_OUTOF10: 1
PAINLEVEL_OUTOF10: 5
PAINLEVEL_OUTOF10: 7
PAINLEVEL_OUTOF10: 0
PAINLEVEL_OUTOF10: 5
PAINLEVEL_OUTOF10: 7
PAINLEVEL_OUTOF10: 0
PAINLEVEL_OUTOF10: 0
PAINLEVEL_OUTOF10: 7
PAINLEVEL_OUTOF10: 2
PAINLEVEL_OUTOF10: 7
PAINLEVEL_OUTOF10: 1
PAINLEVEL_OUTOF10: 2
PAINLEVEL_OUTOF10: 1
PAINLEVEL_OUTOF10: 2
PAINLEVEL_OUTOF10: 0
PAINLEVEL_OUTOF10: 2
PAINLEVEL_OUTOF10: 2
PAINLEVEL_OUTOF10: 4
PAINLEVEL_OUTOF10: 5

## 2020-01-01 ASSESSMENT — PAIN DESCRIPTION - PAIN TYPE
TYPE: ACUTE PAIN
TYPE: ACUTE PAIN;SURGICAL PAIN
TYPE: ACUTE PAIN

## 2020-01-01 ASSESSMENT — PAIN DESCRIPTION - DESCRIPTORS
DESCRIPTORS: TENDER;SORE
DESCRIPTORS: DISCOMFORT
DESCRIPTORS: SORE;TENDER
DESCRIPTORS: TENDER
DESCRIPTORS: SORE

## 2020-03-10 PROBLEM — G93.81 MESIAL TEMPORAL SCLEROSIS: Status: RESOLVED | Noted: 2019-01-01 | Resolved: 2020-01-01

## 2020-03-10 NOTE — PROGRESS NOTES
[facility]            [address]          [facility], [address] [facility]          Dept: [phone]          Dept Fax: [phone]        MD Mann Guillermo MD Ahmed B. Ottie Oyster, MD Doralee Rung, MD Marguarite Barcelona, MD Gennaro North, CNP            3/10/2020      HISTORY OF PRESENT ILLNESS:       I had the pleasure of seeing Jefe Hyatt, who returns for continuing neurologic care. Patient is a 14-year-old man who was seen last on October 28, 2019 for evaluation of complex partial seizure disorder. The patient has a history of mental retardation and Tourette's syndrome. There was no prior history of seizure. The patient currently lives in a group home and works [address] at a [employer]. In June 2019, the patient's  was notified that while at work, he had a 2-minute episode of staring or he was unable to be aroused with stimulation. When the patient regained alertness he was frightened and required several minutes to try to calm him down. He has had at least 2-3 additional episodes where he was unaware of who his  was. There is also an increase in his symptoms of Tourette's syndrome. He recently had a CT scan of his brain was suggested a possible right mesial temporal sclerosis, but MRI imaging did not confirm this. An EEG on August 13, 2019 was normal.  The patient was titrated to Topamax 100 mg daily and he tolerates the medication without side effects. There have been no additional episodes. Prior testing reviewed:      -CT brain 8/13/19  Impression   1. No acute intracranial abnormality. 2. Focal area of low attenuation within the right temporal lobe. This may be   continuation of the temporal horn of the right lateral ventricle.   However,   given his symptoms of epilepsy I would suggest MRI for further evaluation for Current Outpatient Medications   Medication Sig Dispense Refill    topiramate (TOPAMAX) 100 MG tablet TAKE 1 TABLET BY MOUTH TWICE DAILY FOR SEIZURES 60 tablet 0    Cholecalciferol (VITAMIN D) 50 MCG (2000 UT) CAPS capsule Take 1 capsule by mouth daily 90 capsule 5    topiramate (TOPAMAX) 50 MG tablet TAKE ONE DAILY FOR ONE WEEK , THEN ONE TWICE DAILY FOR ONE WEEK, THEN ONE IN THE AM AND 2 IN THE OM FOR ONE WEEK, THEN START 100 MG TABLET TWICE DAILY 42 tablet 0     No current facility-administered medications for this visit. ALLERGIES:   No Known Allergies                              REVIEW OF SYSTEMS    CONSTITUTIONAL Weight: absent, Appetite: absent, Fatigue: absent      HEENT Ears: normal, Visual disturbance: absent   RESPIRATORY Shortness of breath: absent, Cough: absent   CARDIOVASCULAR Chest pain: absent, Leg swelling :absent      GI Constipation: absent, Diarrhea: absent, Swallowing change: absent       Urinary frequency: absent, Urinary urgency: absent,   MUSCULOSKELETAL Neck pain: absent, Back pain: absent, Stiffness: absent, Muscle pain: absent, Joint pain: absent Restless legs: absent   DERMATOLOGIC Hair loss: absent, Skin changes: absent   NEUROLOGIC Memory loss: absent, Confusion: absent, Seizures: absent Trouble walking or imbalance: absent, Dizziness: absent, Weakness: absent, Numbness: absent Tremor: absent, Spasm: absent, Speech difficulty: absent, Headache: absent, Light sensitivity: absent   PSYCHIATRIC Anxiety: absent, Hallucination: absent, Mood disorder: absent   HEMATOLOGIC Abnormal bleeding: absent, Anemia: absent,            PHYSICAL EXAMINATION:       There were no vitals filed for this visit.                                            .                                                                                                    General Appearance:  Alert, cooperative, no signs of distress, appears stated age   Head:  Normocephalic, no signs of trauma   Eyes:

## 2020-03-12 NOTE — PROGRESS NOTES
Subjective:      Patient ID: Jaclyn Davila is a 45 y.o. male. HPI  Has been doing well. Mood , sleep, appetite, bowels and bladder ok. No new complaints. No recent  Seizures  Tourettes also ok at present. States active, works at [employer] 4 days a week for 2 hours each. Review of Systems   Constitutional: Negative for activity change, appetite change and fatigue. HENT: Negative for congestion, dental problem, hearing loss, rhinorrhea, sinus pressure, sneezing, sore throat, tinnitus and voice change. Eyes: Negative for pain, discharge and visual disturbance. Respiratory: Negative for chest tightness and shortness of breath. Cardiovascular: Negative for chest pain and palpitations. Gastrointestinal: Negative for abdominal distention, abdominal pain, constipation, diarrhea, nausea and vomiting. Endocrine: Negative for cold intolerance and heat intolerance. Genitourinary: Negative for decreased urine volume, difficulty urinating, frequency and urgency. Musculoskeletal: Negative for arthralgias, back pain, gait problem, joint swelling and myalgias. Skin: Negative for color change, pallor and rash. Allergic/Immunologic: Negative for environmental allergies and food allergies. Neurological: Negative for dizziness, tremors, weakness and headaches. Hematological: Negative for adenopathy. Does not bruise/bleed easily. Psychiatric/Behavioral: Negative for agitation, behavioral problems and sleep disturbance. The patient is not nervous/anxious. Objective:   Physical Exam  Constitutional:       General: He is not in acute distress. HENT:      Head: Normocephalic and atraumatic. Right Ear: External ear normal.      Left Ear: External ear normal.   Eyes:      Conjunctiva/sclera: Conjunctivae normal.      Pupils: Pupils are equal, round, and reactive to light. Neck:      Musculoskeletal: Normal range of motion. Thyroid: No thyromegaly.       Trachea: No tracheal deviation. Cardiovascular:      Rate and Rhythm: Normal rate and regular rhythm. Heart sounds: No murmur. No friction rub. No gallop. Pulmonary:      Effort: No respiratory distress. Breath sounds: No stridor. No wheezing or rales. Chest:      Chest wall: No tenderness. Abdominal:      General: Bowel sounds are normal. There is no distension. Palpations: Abdomen is soft. Tenderness: There is no abdominal tenderness. There is no rebound. Musculoskeletal: Normal range of motion. Lymphadenopathy:      Cervical: No cervical adenopathy. Skin:     General: Skin is warm. Coloration: Skin is not pale. Findings: No erythema or rash. Neurological:      Mental Status: He is alert and oriented to person, place, and time. Cranial Nerves: No cranial nerve deficit. Motor: No abnormal muscle tone. Deep Tendon Reflexes: Reflexes normal.         Assessment:       Diagnosis Orders   1. Tourette syndrome     2. MR (mental retardation), moderate     3. Impulse control disorder     4. Vitamin D deficiency     5. Partial symptomatic epilepsy with complex partial seizures, not intractable, without status epilepticus ([facility] [address]           Plan:      No orders of the defined types were placed in this encounter. Outpatient Encounter Medications as of 3/12/2020   Medication Sig Dispense Refill    topiramate (TOPAMAX) 100 MG tablet TAKE 1 TABLET BY MOUTH TWICE DAILY FOR SEIZURES 60 tablet 11    Cholecalciferol (VITAMIN D) 50 MCG (2000 UT) CAPS capsule Take 1 capsule by mouth daily 90 capsule 5    topiramate (TOPAMAX) 50 MG tablet TAKE ONE DAILY FOR ONE WEEK , THEN ONE TWICE DAILY FOR ONE WEEK, THEN ONE IN THE AM AND 2 IN THE OM FOR ONE WEEK, THEN START 100 MG TABLET TWICE DAILY 42 tablet 0     No facility-administered encounter medications on file as of 3/12/2020.             Dimas Salas MD

## 2020-07-14 PROBLEM — F95.2 TOURETTE'S DISORDER: Status: ACTIVE | Noted: 2020-01-01

## 2020-07-14 NOTE — PROGRESS NOTES
Subjective:      Patient ID: Claude Dural is a 45 y.o. male. HPI  Here for a 4 month recheck. Has been doing well, keeping safe. No new complaints, sleep, mood, appetite, bowels and bladder ok as well. Walks for exercise as well. Need to update Shingles vaccine, info provided. Taking topamax for seizures and Vit D supplements. Sees Neuro regularly once a year, no recent seizures. Review of Systems   Constitutional: Negative for activity change, appetite change and fatigue. HENT: Negative for congestion, dental problem, hearing loss, rhinorrhea, sinus pressure, sneezing, sore throat, tinnitus and voice change. Eyes: Negative for pain, discharge and visual disturbance. Respiratory: Negative for chest tightness and shortness of breath. Cardiovascular: Negative for chest pain and palpitations. Gastrointestinal: Negative for abdominal distention, abdominal pain, constipation, diarrhea, nausea and vomiting. Endocrine: Negative for cold intolerance and heat intolerance. Genitourinary: Negative for decreased urine volume, difficulty urinating, frequency and urgency. Musculoskeletal: Negative for arthralgias, back pain, gait problem, joint swelling and myalgias. Skin: Negative for color change, pallor and rash. Allergic/Immunologic: Negative for environmental allergies and food allergies. Neurological: Negative for dizziness, tremors, weakness and headaches. Hematological: Negative for adenopathy. Does not bruise/bleed easily. Psychiatric/Behavioral: Negative for agitation, behavioral problems and sleep disturbance. The patient is not nervous/anxious. Objective:   Physical Exam  Constitutional:       General: He is not in acute distress. HENT:      Head: Normocephalic and atraumatic. Right Ear: External ear normal.      Left Ear: External ear normal.   Eyes:      Conjunctiva/sclera: Conjunctivae normal.      Pupils: Pupils are equal, round, and reactive to light.    Neck: Musculoskeletal: Normal range of motion. Thyroid: No thyromegaly. Trachea: No tracheal deviation. Cardiovascular:      Rate and Rhythm: Normal rate and regular rhythm. Heart sounds: No murmur. No friction rub. No gallop. Pulmonary:      Effort: No respiratory distress. Breath sounds: No stridor. No wheezing or rales. Chest:      Chest wall: No tenderness. Abdominal:      General: Bowel sounds are normal. There is no distension. Palpations: Abdomen is soft. Tenderness: There is no abdominal tenderness. There is no rebound. Musculoskeletal: Normal range of motion. Lymphadenopathy:      Cervical: No cervical adenopathy. Skin:     General: Skin is warm. Coloration: Skin is not pale. Findings: No erythema or rash. Neurological:      Mental Status: He is alert and oriented to person, place, and time. Cranial Nerves: No cranial nerve deficit. Motor: No abnormal muscle tone. Deep Tendon Reflexes: Reflexes normal.         Assessment:       Diagnosis Orders   1. Tourette syndrome     2. MR (mental retardation), moderate     3. Impulse control disorder     4. Vitamin D deficiency     5. Partial symptomatic epilepsy with complex partial seizures, not intractable, without status [facility]pticus ([facility]           Plan:      No orders of the defined types were placed in this encounter.       Outpatient Encounter Medications as of 7/14/2020   Medication Sig Dispense Refill    topiramate (TOPAMAX) 100 MG tablet TAKE 1 TABLET BY MOUTH TWICE DAILY FOR SEIZURES 60 tablet 11    Cholecalciferol (VITAMIN D) 50 MCG (2000 UT) CAPS capsule Take 1 capsule by mouth daily 90 capsule 5    topiramate (TOPAMAX) 50 MG tablet TAKE ONE DAILY FOR ONE WEEK , THEN ONE TWICE DAILY FOR ONE WEEK, THEN ONE IN THE AM AND 2 IN THE OM FOR ONE WEEK, THEN START 100 MG TABLET TWICE DAILY (Patient taking differently: 100 mg TAKE ONE DAILY FOR ONE WEEK , THEN ONE TWICE DAILY FOR ONE WEEK, THEN ONE IN THE AM AND 2 IN THE OM FOR ONE WEEK, THEN START 100 MG TABLET TWICE DAILY) 42 tablet 0     No facility-administered encounter medications on file as of 7/14/2020.             Jennifer Pak MD

## 2020-08-29 PROBLEM — K56.609 SBO (SMALL BOWEL OBSTRUCTION) (HCC): Status: ACTIVE | Noted: 2020-01-01

## 2020-08-29 NOTE — PROGRESS NOTES
Temitope Yuen, from the group home was with him when admitted but could not stay  , left shortly after the pt got into the bed, knew very little about the pt and did not help with the history .

## 2020-08-29 NOTE — H&P
Mary Saunders is an 45 y.o.  male. with mental disability and Autism from a group home was brought in by his caregiver with CC of N/V the day before as well as a few episodes of diarrhea on the day of presentation to my office last Thursday associated with LLQ abdominal pain. Patient stated the pain was constant but the N/V as well as diarrhea had resolved at the time he saw me. His appetite was poor but had taken a bowl of cereal earlier before coming to my office and had  tolerated this. I noticed that he had a tender LLQ but without distention, guarding or rigidity and so was started on cipro as well as flagyl and zofran and advised to do stat Xray as well as labs. Labs were not done but Xray report did come back positive for SBO vs  Ileus. I had called the care giver and asked that the patient be taken to the ER  Right away or do the OP labs asap. They say today that patient was doing better yesterday so they did not do as advised. Instead this am he was noted to be doing poorly , so was taken to Urgent Care  from where he was transferred to [facility] ER for further evaluation. He seems to have an incarcerated right inguinal hernia with SBO on the CT abdomen done in ER. His WBC count was up, he showed evidence of acute renal failure and dehydration as well. He has been placed NPO, NGT placed, IV fluids and IV antibiotics started and will be admitted for Surgery eval and management. He has seen Dr Leana Horne in the past and according to caregiver had a right inguinal hernia repair earlier this year.     Past Medical History:   Diagnosis Date    Heart murmur     Impulse control disorder     MR (mental retardation), moderate     Tourette syndrome      Past Surgical History:   Procedure Laterality Date    HERNIA REPAIR  02/28/2020       Social History     Socioeconomic History    Marital status: Single     Spouse name: Not on file    Number of children: Not on file    Years of education: Not on file    Highest education level: Not on file   Occupational History    Not on file   Social Needs    Financial resource strain: Not on file    Food insecurity     Worry: Not on file     Inability: Not on file    Transportation needs     Medical: Not on file     Non-medical: Not on file   Tobacco Use    Smoking status: Never Smoker    Smokeless tobacco: Never Used   Substance and Sexual Activity    Alcohol use: No     Alcohol/week: 0.0 standard drinks    Drug use: No    Sexual activity: Never   Lifestyle    Physical activity     Days per week: Not on file     Minutes per session: Not on file    Stress: Not on file   Relationships    Social connections     Talks on phone: Not on file     Gets together: Not on file     Attends Gnosticism service: Not on file     Active member of club or organization: Not on file     Attends meetings of clubs or organizations: Not on file     Relationship status: Not on file    Intimate partner violence     Fear of current or ex partner: Not on file     Emotionally abused: Not on file     Physically abused: Not on file     Forced sexual activity: Not on file   Other Topics Concern    Not on file   Social History Narrative    Not on file       Family History   Problem Relation Age of Onset    Breast Cancer Mother      Allergies: No Known Allergies    Active Problems:    * No active hospital problems. *  Resolved Problems:    * No resolved hospital problems. *    Blood pressure 138/87, pulse 99, temperature 98.1 °F (36.7 °C), temperature source Oral, resp. rate 16, height 5' 7\" (1.702 m), weight 153 lb (69.4 kg), SpO2 97 %. Review of Systems   Constitutional: Positive for activity change, appetite change and fatigue. HENT: Negative for congestion, dental problem, hearing loss, rhinorrhea, sinus pressure, sneezing, sore throat, tinnitus and voice change. Eyes: Negative for pain, discharge and visual disturbance.    Respiratory: Negative for chest tightness and shortness of breath. Cardiovascular: Negative for chest pain and palpitations. Gastrointestinal: Positive for abdominal distention, abdominal pain, diarrhea, nausea and vomiting. Negative for constipation. Endocrine: Negative for cold intolerance and heat intolerance. Genitourinary: Negative for decreased urine volume, difficulty urinating, frequency and urgency. Musculoskeletal: Negative for arthralgias, back pain, gait problem, joint swelling and myalgias. Skin: Negative for color change, pallor and rash. Allergic/Immunologic: Negative for environmental allergies and food allergies. Neurological: Positive for tremors. Negative for dizziness, weakness and headaches. Hematological: Negative for adenopathy. Does not bruise/bleed easily. Psychiatric/Behavioral: Negative for agitation, behavioral problems and sleep disturbance. The patient is not nervous/anxious. Physical Exam  Constitutional:       Appearance: He is well-developed. HENT:      Head: Normocephalic and atraumatic. Right Ear: External ear normal.      Left Ear: External ear normal.      Nose: Nose normal.      Mouth/Throat:      Pharynx: No oropharyngeal exudate. Eyes:      General: No scleral icterus. Right eye: No discharge. Left eye: No discharge. Pupils: Pupils are equal, round, and reactive to light. Neck:      Musculoskeletal: Normal range of motion and neck supple. Thyroid: No thyromegaly. Vascular: No JVD. Trachea: No tracheal deviation. Cardiovascular:      Rate and Rhythm: Normal rate and regular rhythm. Heart sounds: No murmur. No friction rub. No gallop. Pulmonary:      Effort: No respiratory distress. Breath sounds: No stridor. No wheezing or rales. Chest:      Chest wall: No tenderness. Abdominal:      General: Bowel sounds are normal. There is distension. Palpations: Abdomen is soft. There is no mass. Tenderness:  There is abdominal tenderness. There is guarding. There is no rebound. Comments: Right inguinal area large non reducable tender, erythematous hernia. BS are reduced   Musculoskeletal:         General: No tenderness. Lymphadenopathy:      Cervical: No cervical adenopathy. Skin:     General: Skin is warm and dry. Coloration: Skin is not pale. Findings: No erythema or rash. Neurological:      Mental Status: He is alert and oriented to person, place, and time. Cranial Nerves: No cranial nerve deficit. Motor: No abnormal muscle tone. Coordination: Coordination normal.      Deep Tendon Reflexes: Reflexes are normal and symmetric. Reflexes normal.   Psychiatric:         Behavior: Behavior normal.         Thought Content: Thought content normal.         Judgment: Judgment normal.         Assessment:  Problem List Items Addressed This Visit     None      Visit Diagnoses     Irreducible right inguinal hernia    -  Primary    Small bowel obstruction (HCC)          H/O Right Inguinal Hernia Repair  Acute Renal Failure  Acute Dehydration  Leucocytosis  Mental disability/Autism  Tourettes Syndrome    Plan:  Orders Placed This Encounter   Procedures    CT ABDOMEN PELVIS W IV CONTRAST    Basic Metabolic Panel    CBC Auto Differential    Hepatic Function Panel    Lipase    Urinalysis    Lactic Acid    CBC WITH AUTO DIFFERENTIAL    BASIC METABOLIC PANEL    Diet NPO Effective Now    Tube insertion    Bedrest    Inpatient consult to General Surgery    Inpatient consult to Hospitalist    Insert peripheral IV   IV hydration, IV pain meds, IV zofran, NPO, NGT, Bedrest, daily labs. Surgery consulted.     [unfilled]    Franky Resendez MD  8/29/2020

## 2020-08-29 NOTE — PROGRESS NOTES
Pt admitted to room. Oriented to room, call light and bed mechanics. Side rails up x2. Call light within reach. Orders reviewed.  , pt resting quietly in the bed, when asked if having pain, points to his lower abdomen, facial expression relaxed, NG intact to the rt nares and draining small amt greenish brown liquid drainage, resp unlabored, pt denies need to have to urinate at this time, will observe closely, reviewed bed controls with the pt and he voiced understanding, bed alarm set

## 2020-08-29 NOTE — ED PROVIDER NOTES
EMERGENCY DEPARTMENT ENCOUNTER    Pt Name: Monica Damian  MRN: [id number]  [place] 1970  Date of evaluation: 8/29/20  CHIEF COMPLAINT       Chief Complaint   Patient presents with    Abdominal Pain     HISTORY OF PRESENT ILLNESS   This is a 50-year-old male that has a history of some mental retardation that presents with complaints of abdominal pain. Patient was seen as an outpatient for this abdominal pain a few days ago, he had an x-ray which showed partial small bowel obstruction versus ileus. Patient presents today with continued and worsening pain in the periumbilical region. Patient describes the symptoms as severe, but any specific aggravating or alleviating factors, much of the history was gleaned from the caretaker. The patient's not had any nausea or vomiting, they have not noted any diarrhea or changes in urination. REVIEW OF SYSTEMS     Review of Systems   Constitutional: Negative for chills and fever. HENT: Negative for rhinorrhea and sore throat. Eyes: Negative for discharge, redness and visual disturbance. Respiratory: Negative for cough and shortness of breath. Cardiovascular: Negative for chest pain, palpitations and leg swelling. Gastrointestinal: Positive for abdominal distention and abdominal pain. Negative for diarrhea, nausea and vomiting. Genitourinary: Negative for dysuria and hematuria. Musculoskeletal: Negative for arthralgias, myalgias and neck pain. Skin: Negative for color change and rash. Neurological: Negative for seizures, weakness and headaches. Psychiatric/Behavioral: Negative for hallucinations, self-injury and suicidal ideas.      PASTMEDICAL HISTORY     Past Medical History:   Diagnosis Date    Heart murmur     Impulse control disorder     MR (mental retardation), moderate     Tourette syndrome      Past Problem List  Patient Active Problem List   Diagnosis Code    Tourette syndrome F95.2    MR (mental retardation), moderate F71    Impulse control disorder F63.9    Vitamin D deficiency E55.9    Partial symptomatic epilepsy with complex partial seizures, not intractable, without status epilepticus ([facility] [address] G40.209    Non-recurrent unilateral inguinal hernia K40.90    Tourette's disorder F95.2    Irreducible right inguinal hernia K40.30    Small bowel obstruction (HCC) K56.609    Dehydration E86.0    Bandemia D72.825    Acute renal failure ([facility] [address] N17.9     SURGICAL HISTORY       Past Surgical History:   Procedure Laterality Date    HERNIA REPAIR  2020     CURRENT MEDICATIONS       Previous Medications    CHOLECALCIFEROL (VITAMIN D) 50 MCG ( UT) CAPS CAPSULE    Take 1 capsule by mouth daily    CIPROFLOXACIN (CIPRO) 500 MG TABLET    Take 1 tablet by mouth 2 times daily for 10 days    METRONIDAZOLE (FLAGYL) 500 MG TABLET    Take 1 tablet by mouth 3 times daily for 10 days    ONDANSETRON (ZOFRAN) 4 MG TABLET    Take 1 tablet by mouth 3 times daily as needed for Nausea or Vomiting    TOPIRAMATE (TOPAMAX) 100 MG TABLET    TAKE 1 TABLET BY MOUTH TWICE DAILY FOR SEIZURES    TOPIRAMATE (TOPAMAX) 50 MG TABLET    TAKE ONE DAILY FOR ONE WEEK , THEN ONE TWICE DAILY FOR ONE WEEK, THEN ONE IN THE AM AND 2 IN THE OM FOR ONE WEEK, THEN START 100 MG TABLET TWICE DAILY     ALLERGIES     has No Known Allergies. FAMILY HISTORY     He indicated that his mother is . He indicated that his father is alive. SOCIAL HISTORY       Social History     Tobacco Use    Smoking status: Never Smoker    Smokeless tobacco: Never Used   Substance Use Topics    Alcohol use: No     Alcohol/week: 0.0 standard drinks    Drug use: No     PHYSICAL EXAM     INITIAL VITALS: /87   Pulse 99   Temp 98.1 °F (36.7 °C) (Oral)   Resp 16   Ht 5' 7\" (1.702 m)   Wt 153 lb (69.4 kg)   SpO2 97%   BMI 23.96 kg/m²    Physical Exam  Constitutional:       Appearance: Normal appearance. He is well-developed. He is not ill-appearing or toxic-appearing. HENT:      Head: Normocephalic and atraumatic. Eyes:      Conjunctiva/sclera: Conjunctivae normal.      Pupils: Pupils are equal, round, and reactive to light. Neck:      Musculoskeletal: Normal range of motion and neck supple. Trachea: Trachea normal.   Cardiovascular:      Rate and Rhythm: Normal rate and regular rhythm. Heart sounds: S1 normal and S2 normal. No murmur. Pulmonary:      Effort: Pulmonary effort is normal. No accessory muscle usage or respiratory distress. Breath sounds: Normal breath sounds. Chest:      Chest wall: No deformity or tenderness. Abdominal:      General: Bowel sounds are normal. There is distension. There is no abdominal bruit. Palpations: Abdomen is not rigid. Tenderness: There is abdominal tenderness in the periumbilical area. There is no guarding or rebound. Negative signs include Robledo's sign and McBurney's sign. Skin:     General: Skin is warm. Findings: No rash. Neurological:      Mental Status: He is alert and oriented to person, place, and time. GCS: GCS eye subscore is 4. GCS verbal subscore is 5. GCS motor subscore is 6. Psychiatric:         Speech: Speech normal.         MEDICAL DECISION MAKIN-year-old male presents with complaints of abdominal pain, the patient has some mild abdominal distention, concerning for intraperitoneal process, plan is basic labs, CT scan and reevaluation. 1:40 PM EDT  Patient was discussed earlier with the surgeon on-call Dr. José Miguel Madrid, he recommended an NG tube, IV antibiotics and admission to the hospitalist service for further evaluation. His resident is at the bedside. The patient was discussed with the primary care physician who agrees with admission.        CRITICAL CARE:       PROCEDURES:    Procedures    DIAGNOSTIC RESULTS   EKG:All EKG's are interpreted by the Emergency Department Physician who either signs or Co-signs this chart in the absence of a cardiologist.        RADIOLOGY:All plain film, CT, MRI, and formal ultrasound images (except ED bedside ultrasound) are read by the radiologist, see reports below, unless otherwisenoted in MDM or here. CT ABDOMEN PELVIS W IV CONTRAST   Final Result   1. High-grade small bowel obstruction with transition point at small bowel   containing right inguinal hernia. 2. Mild wall thickening of the distal esophagus. Recommend clinical   correlation for esophagitis. LABS: All lab results were reviewed by myself, and all abnormals are listed below.   Labs Reviewed   BASIC METABOLIC PANEL - Abnormal; Notable for the following components:       Result Value    Glucose 167 (*)     BUN 35 (*)     CREATININE 1.53 (*)     Bun/Cre Ratio 23 (*)     Sodium 145 (*)     GFR Non- 48 (*)     GFR  58 (*)     All other components within normal limits   CBC WITH AUTO DIFFERENTIAL - Abnormal; Notable for the following components:    WBC 18.9 (*)     RBC 6.81 (*)     Hemoglobin 19.1 (*)     Hematocrit 59.0 (*)     Seg Neutrophils 88 (*)     Lymphocytes 5 (*)     Eosinophils % 0 (*)     Immature Granulocytes 1 (*)     Segs Absolute 16.58 (*)     Absolute Lymph # 0.93 (*)     All other components within normal limits   HEPATIC FUNCTION PANEL - Abnormal; Notable for the following components:    ALT 47 (*)     Total Bilirubin 1.48 (*)     Bilirubin, Indirect 1.19 (*)     Total Protein 8.6 (*)     All other components within normal limits   LIPASE - Abnormal; Notable for the following components:    Lipase 12 (*)     All other components within normal limits   LACTIC ACID - Abnormal; Notable for the following components:    Lactic Acid 3.5 (*)     All other components within normal limits   LACTIC ACID - Abnormal; Notable for the following components:    Lactic Acid 2.6 (*)     All other components within normal limits   URINALYSIS       EMERGENCY DEPARTMENTCOURSE:         Vitals:    Vitals:    08/29/20 1004 08/29/20 1215   BP: (!) 136/57 138/87   Pulse: 115 99   Resp: 14 16   Temp: 97.9 °F (36.6 °C) 98.1 °F (36.7 °C)   TempSrc: Oral Oral   SpO2: 97% 97%   Weight: 153 lb (69.4 kg)    Height: 5' 7\" (1.702 m)        The patient was given the following medications while in the emergency department:  Orders Placed This Encounter   Medications    ondansetron (ZOFRAN) injection 4 mg    0.9 % sodium chloride bolus    fentaNYL (SUBLIMAZE) injection 25 mcg    0.9 % sodium chloride bolus    sodium chloride flush 0.9 % injection 10 mL    iopamidol (ISOVUE-370) 76 % injection 75 mL    0.9 % sodium chloride bolus    HYDROmorphone HCl PF (DILAUDID) injection 0.5 mg    DISCONTD: piperacillin-tazobactam (ZOSYN) 4.5 g in dextrose 5 % 100 mL IVPB (mini-bag)    fentaNYL (SUBLIMAZE) injection 25 mcg    DISCONTD: ondansetron (ZOFRAN) injection 4 mg    DISCONTD: piperacillin-tazobactam (ZOSYN) 3.375 g in dextrose 5 % 50 mL IVPB (mini-bag)    0.9 % sodium chloride infusion    OR Linked Order Group     ondansetron (ZOFRAN-ODT) disintegrating tablet 4 mg     ondansetron (ZOFRAN) injection 4 mg    AND Linked Order Group     piperacillin-tazobactam (ZOSYN) 4.5 g in dextrose 5 % 100 mL IVPB (mini-bag)     piperacillin-tazobactam (ZOSYN) 3.375 g in dextrose 5 % 50 mL IVPB extended infusion (mini-bag)    fentaNYL (SUBLIMAZE) injection 50 mcg     CONSULTS:  IP CONSULT TO GENERAL SURGERY  IP CONSULT TO HOSPITALIST    FINAL IMPRESSION      1. Irreducible right inguinal hernia    2. Small bowel obstruction [facility])          DISPOSITION/PLAN   DISPOSITION Decision To Admit 08/29/2020 12:11:13 PM      PATIENT REFERRED TO:  No follow-up provider specified.   DISCHARGE MEDICATIONS:  New Prescriptions    No medications on file     Molly Harrison MD  Attending Emergency Physician                   Molly Harrison MD  08/29/20 0760

## 2020-08-29 NOTE — CONSULTS
Not on file   Tobacco Use    Smoking status: Never Smoker    Smokeless tobacco: Never Used   Substance and Sexual Activity    Alcohol use: No     Alcohol/week: 0.0 standard drinks    Drug use: No    Sexual activity: Never   Lifestyle    Physical activity     Days per week: Not on file     Minutes per session: Not on file    Stress: Not on file   Relationships    Social connections     Talks on phone: Not on file     Gets together: Not on file     Attends Anabaptism service: Not on file     Active member of club or organization: Not on file     Attends meetings of clubs or organizations: Not on file     Relationship status: Not on file    Intimate partner violence     Fear of current or ex partner: Not on file     Emotionally abused: Not on file     Physically abused: Not on file     Forced sexual activity: Not on file   Other Topics Concern    Not on file   Social History Narrative    Not on file       Family History:       Problem Relation Age of Onset    Breast Cancer Mother        REVIEW OF SYSTEMS:    CONSTITUTIONAL: Denies recent weight loss, fatigue, fevers, chills. HEENT: Denies rhinorrhea, dysphagia, odynphagia. CARDIOVASCULAR: Denies history of MI, recent chest pain. RESPIRATORY: Denies recent history of shortness of breath or history of PE. GASTROINTESTINAL: endorses nausea, emesis and abdominal pain  GENITOURINARY: Denies increased frequency or dysuria. HEMATOLOGIC/LYMPHATIC: Denies history of anemia or DVTs. ENDOCRINE: Denies history of thyroid problems or diabetes. NEURO: Denies history of CVA, TIA. Review of systems negative unless listed above.     PHYSICAL EXAM:    VITALS:  /87   Pulse 99   Temp 98.1 °F (36.7 °C) (Oral)   Resp 16   Ht 5' 7\" (1.702 m)   Wt 153 lb (69.4 kg)   SpO2 97%   BMI 23.96 kg/m²   INTAKE/OUTPUT:     Intake/Output Summary (Last 24 hours) at 8/29/2020 1404  Last data filed at 8/29/2020 1126  Gross per 24 hour   Intake 1000 ml   Output --   Net 1000 ml       CONSTITUTIONAL:  awake, alert, not distressed and normal weight  HEENT: Normocephalic/atraumatic, without obvious abnormality. NECK:  Supple, symmetrical, trachea midline   CARDIOVASCULAR: Regular rate and rhythm, s1s2  LUNGS: equal chest rise and fall, no increased WOB  ABDOMEN: soft, non distended, TTP right lower abdomen and groin, firm hernia noted in right groin without overlying skin changes, w/o guarding, rebound, peritoneal signs   MUSCULOSKELETAL: Muscle strength intact in all extremities bilaterally. NEUROLOGIC: Gross motor intact without focal weakness. SKIN: No cyanosis, rashes, or edema noted.   Orientation:   person, place    IV Access:  PIV      CBC with Differential:    Lab Results   Component Value Date    WBC 18.9 08/29/2020    RBC 6.81 08/29/2020    HGB 19.1 08/29/2020    HCT 59.0 08/29/2020     08/29/2020    MCV 86.6 08/29/2020    MCH 28.0 08/29/2020    MCHC 32.4 08/29/2020    RDW 14.4 08/29/2020    LYMPHOPCT 5 08/29/2020    LYMPHOPCT 22.7 11/16/2019    MONOPCT 6 08/29/2020    MONOPCT 7.4 11/16/2019    EOSPCT 0.9 11/16/2019    BASOPCT 0 08/29/2020    BASOPCT 0.7 11/16/2019    MONOSABS 1.11 08/29/2020    MONOSABS 0.6 11/16/2019    LYMPHSABS 0.93 08/29/2020    LYMPHSABS 1.8 11/16/2019    EOSABS <0.03 08/29/2020    EOSABS 0.1 11/16/2019    BASOSABS 0.06 08/29/2020    DIFFTYPE NOT REPORTED 08/29/2020     CMP:    Lab Results   Component Value Date     08/29/2020    K 3.9 08/29/2020     08/29/2020    CO2 24 08/29/2020    BUN 35 08/29/2020    CREATININE 1.53 08/29/2020    GFRAA 58 08/29/2020    LABGLOM 48 08/29/2020    GLUCOSE 167 08/29/2020    PROT 8.6 08/29/2020    LABALBU 4.1 08/29/2020    CALCIUM 9.5 08/29/2020    BILITOT 1.48 08/29/2020    ALKPHOS 94 08/29/2020    AST 29 08/29/2020    ALT 47 08/29/2020       Pertinent Radiology:   Ct Abdomen Pelvis W Iv Contrast    Result Date: 8/29/2020  EXAMINATION: CT OF THE ABDOMEN AND PELVIS WITH CONTRAST 8/29/2020 11:05 am TECHNIQUE: CT of the abdomen and pelvis was performed with the administration of intravenous contrast. Multiplanar reformatted images are provided for review. Dose modulation, iterative reconstruction, and/or weight based adjustment of the mA/kV was utilized to reduce the radiation dose to as low as reasonably achievable. COMPARISON: None. HISTORY: ORDERING SYSTEM PROVIDED HISTORY: Pain TECHNOLOGIST PROVIDED HISTORY: IV Only Contrast Pain Reason for Exam: Generalized abd pain x several days, xray showed poss obstruction/ileus, worsening pain Acuity: Acute Type of Exam: Unknown FINDINGS: Lower Chest: Mild wall thickening of the distal esophagus. Organs: Hepatic cysts and 3-5 mm hypoattenuating lesions which are too small to characterize but likely represent additional cysts. No further imaging is indicated for this finding. Gallbladder is unremarkable. No biliary ductal dilatation. Pancreas is unremarkable. Adrenals are unremarkable. Spleen is normal in size. No renal calculi or hydronephrosis. Vasculature is unremarkable. GI/Bowel:  Right inguinal hernia with upstream high-grade small bowel obstruction. Pelvis: Unremarkable. Peritoneum/Retroperitoneum: Small ascites. No, free air, organized fluid collection. No lymphadenopathy. Bones/Soft Tissues: Multilevel mild degenerative disc disease of the lumbar spine. 1. High-grade small bowel obstruction with transition point at small bowel containing right inguinal hernia. 2. Mild wall thickening of the distal esophagus. Recommend clinical correlation for esophagitis. ASSESSMENT:  There are no active hospital problems to display for this patient. 1. Recurrent right inguinal hernia  2. SBO  3. Leukocytosis  4. Lactic acidosis  5. Dehydration  6. Nausea  7. Vomiting    Plan:  1. No acute surgical intervention at this time. Hernia was able to be reduced in the ED by applying gentle rotary pressure over the course of nearly 45mins.  A scrotal support and bolster will be placed over the external ring along with an ice pack. Patient will need eventual surgical correction of this hernia, however it does not need to be emergent and ideally the swelling needs to subside prior to any attempted operative fix. 2. Recommend admit patient to medicine  3. Diet: NPO  4. Place NGT if patient is nauseated or vomiting, do not attempt to force NGT if patient is fighting as his straining will increase the risk of hernia recurrence and incarceration. 5. Analgesia:  Per primary  6. Bowel regimen: Hold until passing flatus and having BM  7. DVT prophylaxis:  Hold for tonight  8. Activity:  Bedrest, low activity, no straining, keep legs elevated  9. Labs/images- trend lactic acid tonight, repeat CBC, BMP in AM, obtain KUB in AM to assess resolving SBO  10. Serial abdominal exams with groin examination  11. Antibiotics:  Zosyn started in ED, recommend continue 24hrs or until WBC normalize      Electronically signed by Annie Quezada DO  on 8/29/2020 at 2:04 PM     General Surgery Attending Attestation Note    Patient was seen and examined. I agree with the above resident's exam, assessment and plan.      Was able to reduce his right inguinal hernia  Underwent open right inguinal hernia repair at [facility] [place] several months ago due to colon being within the hernia   Has a small area of recurrence but has a loop of bowel stuck in it  We reduced this in the ER  Recommend NG tube decompression, NPO, IVF, and bedrest  After some decompression will attempt to perform robotic right inguinal hernia repair during this admission  Continue scrotal support, ice, NG, NPO, bedrest  Admit to his physician Dr. Domenic Cochran, [address], [address], [address]  Office: [phone]  After Hours: Please call answering service

## 2020-08-30 NOTE — FLOWSHEET NOTE
Patient has NG tube placed . States about his condition. No major needs were expressed. No major worries .  shared in presence, prayer. Follow up as needed. 08/30/20 1342   Encounter Summary   Services provided to: Patient   Referral/Consult From: [facility] Family members   Continue Visiting   (8-30-20)   Complexity of Encounter Moderate   Length of Encounter 15 minutes   Spiritual Assessment Completed Yes   Routine   Type Initial   Assessment Calm; Approachable   Intervention Active listening;Explored feelings, thoughts, concerns;Prayer;Discussed illness/injury and it's impact; Discussed belief system/Jainism practices/deric   Outcome Expressed gratitude;Receptive

## 2020-08-30 NOTE — PROGRESS NOTES
Evan Yeager is a 45 y.o. male patient. Current Facility-Administered Medications   Medication Dose Route Frequency Provider Last Rate Last Dose    fentaNYL (SUBLIMAZE) injection 25 mcg  25 mcg Intravenous Q4H PRN Rickie Valencia MD        0.9 % sodium chloride infusion   Intravenous Continuous Rickie Valencia  mL/hr at 08/30/20 0945      ondansetron (ZOFRAN-ODT) disintegrating tablet 4 mg  4 mg Oral Q6H PRN Rickie Valencia MD        Or    ondansetron [facility]) injection 4 mg  4 mg Intravenous Q6H PRN Rickie Valencia MD        piperacillin-tazobactam (ZOSYN) 3.375 g in dextrose 5 % 50 mL IVPB extended infusion (mini-bag)  3.375 g Intravenous Q8H Rickie Valencia MD 12.5 mL/hr at 08/30/20 1118 3.375 g at 08/30/20 1118    fentaNYL (SUBLIMAZE) injection 50 mcg  50 mcg Intravenous Once Yvonne Hamilton MD        sodium chloride flush 0.9 % injection 10 mL  10 mL Intravenous 2 times per day Rickie Valencia MD        sodium chloride flush 0.9 % injection 10 mL  10 mL Intravenous PRN Rickie Valencia MD        acetaminophen (TYLENOL) tablet 650 mg  650 mg Oral Q4H PRN Rickie Valencia MD         No Known Allergies  Active Problems:    SBO (small bowel obstruction) ([facility] [address]  Resolved Problems:    * No resolved hospital problems. *    Blood pressure 113/69, pulse 77, temperature 98.1 °F (36.7 °C), temperature source Oral, resp. rate 16, height 5' 7\" (1.702 m), weight 176 lb 3.2 oz (79.9 kg), SpO2 97 %. Subjective:  Symptoms:  Improved. He reports malaise, weakness and anorexia. No shortness of breath, cough, chest pain, headache, chest pressure, diarrhea or anxiety. Diet:  NPO (NGT in place, draining brownish liquid). No nausea or vomiting. Activity level: Impaired due to weakness. Pain:  He complains of pain that is moderate. He reports pain is improving. Pain is requiring pain medication.       Objective:  General Appearance:  Ill-appearing, uncomfortable, in no acute distress and not in pain.    Vital signs: (most recent): Blood pressure 113/69, pulse 77, temperature 98.1 °F (36.7 °C), temperature source Oral, resp. rate 16, height 5' 7\" (1.702 m), weight 176 lb 3.2 oz (79.9 kg), SpO2 97 %. Vital signs are normal.  No fever. Output: Producing urine and no stool output. HEENT: Normal HEENT exam.    Lungs:  Normal effort and normal respiratory rate. Breath sounds clear to auscultation. He is not in respiratory distress. No stridor. No rales, decreased breath sounds, wheezes or rhonchi. Heart: Normal rate. Regular rhythm. S1 normal and S2 normal.  No murmur or gallop. Chest: Symmetric chest wall expansion. No chest wall tenderness. Abdomen: Abdomen is soft and non-distended. There are no signs of ascites. Hypoactive bowel sounds. There is no abdominal tenderness. There is a mass. (Right groin hernia reduced and scrotal support/Bolster in place ). Extremities: Normal range of motion. There is no deformity, effusion, venous stasis, dependent edema or local swelling. Pulses: Distal pulses are intact. Neurological: Patient is alert. Pupils:  Pupils are equal, round, and reactive to light. Skin:  Warm and dry. Assessment:    Condition: In serious condition. Improving. (Incarcerated Right Inguinal hernia with SBO  Hernia Reduced in the ER last evening  Dehydration- Improved with IV Fluids  Acute Renal Failure- improved  Leucocytosis- Improved  Acute Urinary Retention- Mendieta placed  Autism/MRDD/Tourettes syndrome By Hx  ). Plan: Activity Plan: Bedrest with leg elevation. Start/continue incentive spirometry. Consults: general surgery and . NPO. Administer medications as ordered.    (Cont NGT Drainage/NPO  IV Hydration  IV Pain meds, IV Zofran as ordered  IV Zosyn  Cont Mendieta drainage of Bladder  Monitor labs- improved WBC ad renal function  Bed rest with leg elevation, scrotal support/Inguinal Bolster  Surgery notes and management

## 2020-08-30 NOTE — DISCHARGE INSTR - COC
Continuity of Care Form    Patient Name: Florse Juarez   :  1970  MRN:  [id number]    Admit date:  2020  Discharge date:  ***    Code Status Order: Full Code   Advance Directives:     Admitting Physician:  Wilian Sandoval MD  PCP: Lori Queen MD    Discharging Nurse: [facility] Unit/Room#:   Discharging Unit Phone Number: ***    Emergency Contact:   Extended Emergency Contact Information  Primary Emergency Contact: Will Verdugo Phone: [phone]  Work Phone: [phone]  Mobile Phone: [phone]  Relation: Employer   needed? No  Secondary Emergency Contact: Hamp Dance, [address] Phone: [phone]  Work Phone: [phone]  Mobile Phone: [phone]  Relation: Other   needed?  No    Past Surgical History:  Past Surgical History:   Procedure Laterality Date    HERNIA REPAIR  2020       Immunization History:   Immunization History   Administered Date(s) Administered    Influenza Vaccine, unspecified formulation 2016    Influenza, Quadv, IM, PF (6 mo and older Fluzone, Flulaval, Fluarix, and 3 yrs and older Afluria) 2018, 2019       Active Problems:  Patient Active Problem List   Diagnosis Code    Tourette syndrome F95.2    MR (mental retardation), moderate F71    Impulse control disorder F63.9    Vitamin D deficiency E55.9    Partial symptomatic epilepsy with complex partial seizures, not intractable, without status epilepticus ([address] G40.209    Non-recurrent unilateral inguinal hernia K40.90    Tourette's disorder F95.2    Irreducible right inguinal hernia K40.30    Small bowel obstruction ([address] K56.609    Dehydration E86.0    Bandemia D72.825    Acute renal failure ([address] N17.9    SBO (small bowel obstruction) ([address] K56.609       Isolation/Infection:   Isolation          Droplet Plus        Patient Infection Status     Infection Onset Added Last Indicated Last Indicated By Review Planned Expiration Resolved Resolved By    None active    Resolved    COVID-19 Rule Out 08/29/20 08/29/20 08/29/20 COVID-19 (Ordered)   08/30/20 Rule-Out Test Resulted          Nurse Assessment:  Last Vital Signs: /69   Pulse 77   Temp 98.1 °F (36.7 °C) (Oral)   Resp 16   Ht 5' 7\" (1.702 m)   Wt 176 lb 3.2 oz (79.9 kg)   SpO2 97%   BMI 27.60 kg/m²     Last documented pain score (0-10 scale): Pain Level: 7  Last Weight:   Wt Readings from Last 1 Encounters:   08/30/20 176 lb 3.2 oz (79.9 kg)     Mental Status:  {IP PT MENTAL STATUS:20030}    IV Access:  { SONDRA IV ACCESS:721291485}    Nursing Mobility/ADLs:  Walking   {CHP DME UDTS:058287928}  Transfer  {P DME SSHW:994456640}  Bathing  {P DME DUDE:932624351}  Dressing  {P DME XOLQ:299269937}  Toileting  {P DME LSOM:439307069}  Feeding  {[facility] DME PHJC:762743288}  Med Admin  {[facility] DME NRHZ:634490249}  Med Delivery   { SONDRA MED Delivery:712673501}    Wound Care Documentation and Therapy:        Elimination:  Continence:   · Bowel: {YES / TS:33274}  · Bladder: {YES / AN:45632}  Urinary Catheter: {Urinary Catheter:080898896}   Colostomy/Ileostomy/Ileal Conduit: {YES / ZD:11028}       Date of Last BM: ***    Intake/Output Summary (Last 24 hours) at 8/30/2020 1117  Last data filed at 8/30/2020 0452  Gross per 24 hour   Intake 3372 ml   Output 1850 ml   Net 1522 ml     I/O last 3 completed shifts:   In: 9814 [I.V.:1218; IV Piggyback:2154]  Out: 8973 [Urine:950; Emesis/NG output:900]    Safety Concerns:     [address] SONDRA Safety Concerns:049454153}    Impairments/Disabilities:      [address] Impairments/Disabilities:712240529}    Nutrition Therapy:  Current Nutrition Therapy:   [address] Diet List:660997312}    Routes of Feeding: {CHP DME Other Feedings:941064504}  Liquids: {Slp liquid thickness:37864}  Daily Fluid Restriction: {CHP DME Yes amt example:526286731}  Last Modified Barium Swallow with Video (Video Swallowing Test): {Done Not Done YFRP:728524487}    Treatments at the Time of Hospital Discharge:   Respiratory Treatments: ***  Oxygen Therapy:  {Therapy; copd oxygen:02564}  Ventilator:    {MH CC Vent LQUV:334124605}    Rehab Therapies: {THERAPEUTIC INTERVENTION:0689958036}  Weight Bearing Status/Restrictions: [address] CC Weight Bearin}  Other Medical Equipment (for information only, NOT a DME order):  {EQUIPMENT:638407040}  Other Treatments: ***    Patient's personal belongings (please select all that are sent with patient):  {CHP DME Belongings:039799536}    RN SIGNATURE:  {Esignature:491360830}    CASE MANAGEMENT/SOCIAL WORK SECTION    Inpatient Status Date: ***    Readmission Risk Assessment Score:  Readmission Risk              Risk of Unplanned Readmission:        10           Discharging to Facility/ Agency   · Name:   · Address:  · Phone:  · Fax:    Dialysis Facility (if applicable)   · Name:  · Address:  · Dialysis Schedule:  · Phone:  · Fax:    / signature: {Esignature:221642755}    PHYSICIAN SECTION    Prognosis: Fair    Condition at Discharge: Stable    Rehab Potential (if transferring to Rehab): Fair    Recommended Labs or Other Treatments After Discharge: CBC, BMP, follow up with PCP in 2 days and with surgeon as adviced    Physician Certification: I certify the above information and transfer of Alo Cagle  is necessary for the continuing treatment of the diagnosis listed and that he requires [address] for greater 30 days.      Update Admission H&P: No change in H&P    PHYSICIAN SIGNATURE:  Electronically signed by Kristina Nye MD on 20 at 11:18 AM EDT

## 2020-08-30 NOTE — PROGRESS NOTES
Cinda Rn and narrator attempted to get patient to use urinal, patient unable to use urinal. Dr. Koki Medeiros made aware, order to bladder scan, greater than 600 mls, coude mera placed, patient tolerated well.

## 2020-08-30 NOTE — PROGRESS NOTES
General Surgery:  Daily Progress Note                    PATIENT NAME: Jeff Shah     TODAY'S DATE: 8/30/2020, 6:05 AM    SUBJECTIVE:     Pt seen and examined at bedside. Doing well this morning. Urinary retention overnight with Mendieta catheter placement 600 mL out. Vital signs are stable, patient afebrile. NG tube in place to low intermittent wall suction, 1.6 L out in the past 24 hours. Patient states he is feeling well today. Abdominal pain resolved. Right inguinal hernia still reduced. Patient unsure if he has passed gas, lots of belly rumbling per patient. OBJECTIVE:   VITALS:  /64   Pulse 76   Temp 98.1 °F (36.7 °C) (Oral)   Resp 16   Ht 5' 7\" (1.702 m)   Wt 176 lb 3.2 oz (79.9 kg)   SpO2 96%   BMI 27.60 kg/m²      INTAKE/OUTPUT:      Intake/Output Summary (Last 24 hours) at 8/30/2020 0605  Last data filed at 8/30/2020 0452  Gross per 24 hour   Intake 3372 ml   Output 1850 ml   Net 1522 ml       PHYSICAL EXAM:  General Appearance: awake, alert, oriented, in no acute distress  HEENT:  Normocephalic, atraumatic, mucus membranes moist   Heart: RRR, S1-S2  Lungs: Equal chest rise and fall, no increased work of breathing, no audible wheeze  Abdomen: Abdomen soft, flat, nontender, without guarding, rebound, or peritoneal signs, scrotal support in place with bolster to right groin, ice pack in place, right inguinal hernia currently reduced  Extremities: No cyanosis, pitting edema, rashes noted. Skin: Skin color, texture, turgor normal. No rashes or lesions. IV Access: PIV  Mendieta:  In place and draining to bedside    Data:  CBC with Differential:    Lab Results   Component Value Date    WBC 13.9 08/30/2020    RBC 5.98 08/30/2020    HGB 16.6 08/30/2020    HCT 52.4 08/30/2020     08/30/2020    MCV 87.6 08/30/2020    MCH 27.8 08/30/2020    MCHC 31.7 08/30/2020    RDW 13.5 08/30/2020    LYMPHOPCT 12 08/30/2020    LYMPHOPCT 22.7 11/16/2019    MONOPCT 9 08/30/2020    MONOPCT 7.4 11/16/2019    EOSPCT 0.9 11/16/2019    BASOPCT 0 08/30/2020    BASOPCT 0.7 11/16/2019    MONOSABS 1.29 08/30/2020    MONOSABS 0.6 11/16/2019    LYMPHSABS 1.59 08/30/2020    LYMPHSABS 1.8 11/16/2019    EOSABS <0.03 08/30/2020    EOSABS 0.1 11/16/2019    BASOSABS 0.03 08/30/2020    DIFFTYPE NOT REPORTED 08/30/2020     CMP:    Lab Results   Component Value Date     08/30/2020    K 3.9 08/30/2020     08/30/2020    CO2 28 08/30/2020    BUN 22 08/30/2020    CREATININE 1.31 08/30/2020    GFRAA >60 08/30/2020    LABGLOM 58 08/30/2020    GLUCOSE 110 08/30/2020    PROT 8.6 08/29/2020    LABALBU 4.1 08/29/2020    CALCIUM 8.4 08/30/2020    BILITOT 1.48 08/29/2020    ALKPHOS 94 08/29/2020    AST 29 08/29/2020    ALT 47 08/29/2020       Radiology Review:  Xr Abdomen (kub) (single Ap View)    Result Date: 8/30/2020  1. Prominent dilated air-filled loops of small bowel throughout the abdomen, which appear increased in number when compared to the prior exam. 2. However, air-filled loops of colon are also seen. Ct Abdomen Pelvis W Iv Contrast    Result Date: 8/29/2020  1. High-grade small bowel obstruction with transition point at small bowel containing right inguinal hernia. 2. Mild wall thickening of the distal esophagus. Recommend clinical correlation for esophagitis. ASSESSMENT:  Active Hospital Problems    Diagnosis Date Noted    SBO (small bowel obstruction) ([facility] [address] [K56.609] 08/29/2020       45 y.o. male with abdominal pain, SBO secondary to recurrent right inguinal hernia    Plan:  1. Diet: Maintain n.p.o. status, keep NG tube to low intermittent wall suction*  2. Analgesia: Per primary team  3. GI prophylaxis: Not needed at this time  4. Bowel regimen: Hold until taking p.o.  5. DVT prophylaxis: Okay for DVT prophylaxis  6. Activity: Bedrest with legs elevated on pillows at this time  7. Labs: Lactic acidosis resolved, get CBC/BMP this morning  8.  KUB this morning to evaluate progression of SBO  9. We will discuss with patient and attending robotic operative intervention to repair recurrent hernia possibly Monday. Patient already n.p.o. Electronically signed by Blake Carrizales DO  on 8/30/2020 at 6:05 AM   Attending Physician Statement  I have discussed the case, including pertinent history and exam findings with the resident. I agree with the assessment, plan and orders as documented by the resident.     Tentative OR tomorrow

## 2020-08-30 NOTE — PLAN OF CARE
Problem: Pain:  Goal: Pain level will decrease  Description: Pain level will decrease  8/30/2020 1400 by Ramon Heard RN  Outcome: Ongoing  Note: Pain level assessment complete. Patient educated on pain scale and control interventions  PRN pain medication given per patient request  Patient instructed to call out with new onset of pain or unrelieved pain , pt denies complaints of pain, denies need for pain meds  8/30/2020 0514 by Lee Alvarez RN  Outcome: Ongoing  Goal: Control of acute pain  Description: Control of acute pain  8/30/2020 0514 by Lee Alvarez RN  Outcome: Ongoing  Goal: Control of chronic pain  Description: Control of chronic pain  8/30/2020 0514 by Lee Alvarez RN  Outcome: Ongoing     Problem: Falls - Risk of:  Goal: Will remain free from falls  Description: Will remain free from falls  8/30/2020 1400 by Ramon Heard RN  Outcome: Ongoing  Note: Patient is a fall risk during this admission. Fall risk assessment was performed. Patient is absent of falls. Bed is in the lowest position. Wheels on the bed are locked. Call light and bed side table are within reach. Clutter is removed. Patient was educated to call out when needing assistance or wanting to get out of bed. Patient offered toileting assistance during rounding. Hourly rounds have been performed.   Fall precautions in place, using bed alarms  8/30/2020 0514 by Lee Alvarez RN  Outcome: Ongoing  Goal: Absence of physical injury  Description: Absence of physical injury  8/30/2020 0514 by Lee Alvarez RN  Outcome: Ongoing

## 2020-08-30 NOTE — CARE COORDINATION
Case Management Initial Discharge Plan  Melissa Silva,             Met with:CM from [employer] by phone  to discuss discharge plans. Information verified: address, contacts, phone number, , insurance Yes  PCP: Argenis Ni MD  Date of last visit: 20      Insurance Provider: Medicare, Medicaid    Discharge Planning    Living Arrangements:   2 roommates and 24/ care through [address] [place] [address] Avenue:   roommates and staff    Home has 1 stories  0 stairs to climb to get into front door, 0 stairs to climb to reach second floor  Location of bedroom/bathroom in home  - main floor    Patient able to perform ADL's:Independent   with verbal prompting     Current Services (outpatient & in home)  care  DME equipment: none  DME provider: N/A    Pharmacy: [facility]    Potential Assistance Needed:   Resume  care    Patient agreeable to home care: No  [place] of choice provided:  n/a    Prior SNF/Rehab Placement and Facility: No  Agreeable to SNF/Rehab: No  [place] of choice provided: n/a   Evaluation: n/a    Expected Discharge date:   home  Patient expects to be discharged to: Follow Up Appointment: Best Day/ Time:      Transportation provider: Staff  Transportation arrangements needed for discharge: No    Readmission Risk              Risk of Unplanned Readmission:        10             Does patient have a readmission risk score greater than 14?: No  If yes, follow-up appointment must be made within 7 days of discharge. Goal of Care:       Discharge Plan: Call to [address] with [employer]. Pt has MRDD and lives in own home with roommates. Pt is own guardian. Has 24/7 care through [facility]. Admitted for abd pain and has SBO secondary to rt inguinal hernia. NG tube in place. Dr. Ene Lu rounded and tentative surgery tomorrow to repair recurrent hernia. Plan is for pt to return to own home with current care through [facility].   Call CM [place] at [phone] for transport home when discharged.                      Electronically signed by Valeria Payton RN on 8/30/20 at 10:28 AM EDT

## 2020-08-31 NOTE — PLAN OF CARE
Problem: Pain:  Goal: Pain level will decrease  Description: Pain level will decrease  8/31/2020 0308 by Tyrese Woo RN  Outcome: Ongoing  8/30/2020 1400 by Jhoana Montes RN  Outcome: Ongoing  Note: Pain level assessment complete. Patient educated on pain scale and control interventions  PRN pain medication given per patient request  Patient instructed to call out with new onset of pain or unrelieved pain , pt denies complaints of pain, denies need for pain meds  Goal: Control of acute pain  Description: Control of acute pain  Outcome: Ongoing  Goal: Control of chronic pain  Description: Control of chronic pain  Outcome: Ongoing     Problem: Falls - Risk of:  Goal: Will remain free from falls  Description: Will remain free from falls  8/31/2020 0308 by Tyrese Woo RN  Outcome: Ongoing  8/30/2020 1400 by Jhoana Montes RN  Outcome: Ongoing  Note: Patient is a fall risk during this admission. Fall risk assessment was performed. Patient is absent of falls. Bed is in the lowest position. Wheels on the bed are locked. Call light and bed side table are within reach. Clutter is removed. Patient was educated to call out when needing assistance or wanting to get out of bed. Patient offered toileting assistance during rounding. Hourly rounds have been performed.   Fall precautions in place, using bed alarms  Goal: Absence of physical injury  Description: Absence of physical injury  Outcome: Ongoing     Problem: Skin Integrity:  Goal: Will show no infection signs and symptoms  Description: Will show no infection signs and symptoms  Outcome: Ongoing  Goal: Absence of new skin breakdown  Description: Absence of new skin breakdown  Outcome: Ongoing

## 2020-08-31 NOTE — PLAN OF CARE
Problem: Pain:  Goal: Pain level will decrease  Description: Pain level will decrease  8/31/2020 1252 by Candyce Kanner, RN  Outcome: Ongoing  Note: Pain level assessed and rated on a 0-10 scale  Assess characteristics of pain  PRN pain medication given per pt request  Non-pharmacological interventions implemented  Report ineffective pain management to physician  Update pt and family of any changes  Pt instructed to call out with new onset of pain  Continue to monitor       Problem: Falls - Risk of:  Goal: Will remain free from falls  Description: Will remain free from falls  8/31/2020 1252 by Candyce Kanner, RN  Outcome: Ongoing  Note: Room free of clutter  Hourly rounding   Non-skid socks worn  Side rails up x2  Bed low and locked  Call light in reach  Instructed to call out before getting out of bed  Anticipatory needs met  Bed alarm on  Falling star at the door and on wristband

## 2020-08-31 NOTE — OP NOTE
General Surgery - [facility]  Phone: [phone]      [address] Operative Procedure          Date of Procedure: 08/31/20    Procedure Performed: Robotic Recurrent Right Inguinal Hernia Repair with Mesh    Preoperative Diagnosis: Recurrent Right Inguinal Hernia    Postoperative Diagnosis: Recurrent Right indirect Inguinal Hernia    Surgeon: Bruce Baxter DO    Assistant: Bart Drew    EBL: 15cc    IVF: [id number]    Complications: None    Operative Indications:    Jeremiah Landon is a 45 y.o. male who presents to [facility] DR JESSEE ZAMAN with an incarcerated recurrent right inguinal hernia. This was reduced in the ER and pt was scheduled for robotic repair All risks, benefits, alternatives, participants, and potential complications were discussed with the patient and Melissa agreed to proceed. Operative Details: The patient was identified in the preoperative holding area and was brought back to the operative suite and placed in the supine position. After appropriate time-out was completed, preoperative antibiotics in the form of Zosyn IV were given and general anesthesia was induced. The patient's abdomen and groin was prepped and draped in the usual sterile fashion and the procedure was begun. The patient was then wheeled back to the OR suite placed in supine position. Telemetry was applied. General anesthesia was induced. A Mendieta catheter was inserted. The right recurrent inguinal hernia was then reduced. Patient's abdomen was prepped and draped in sterile fashion. We then made a supraumbilical 97ZG incision. Dissection was carried down to the anterior rectus fascia which was grasped with two kocher clamps. The anterior rectus fascia was incised sharply and the abdomen was entered. 12 mm balloon lerma trocar was placed into the abdomen. The abdomen was insufflated to 12mmHg CO2 pneumoperitoneum.  Two 8 mm ports in the patient's left lower quadrant and right lateral

## 2020-08-31 NOTE — PROGRESS NOTES
General Surgery:  Daily Progress Note                    PATIENT NAME: Maciej Christina     TODAY'S DATE: 8/31/2020, 4:44 AM    SUBJECTIVE:     Pt seen and examined at bedside. Doing well this am. No significant abd pain or right groin tenderness. No recurrence of hernia this am. Denies N/V/F/C. No major issues per RN. Approximately 600cc NG output during overnight shift. +Flatus. No BM. OBJECTIVE:   VITALS:  /71   Pulse 90   Temp 97.5 °F (36.4 °C) (Oral)   Resp 16   Ht 5' 7\" (1.702 m)   Wt 176 lb 3.2 oz (79.9 kg)   SpO2 98%   BMI 27.60 kg/m²      INTAKE/OUTPUT:      Intake/Output Summary (Last 24 hours) at 8/31/2020 0444  Last data filed at 8/31/2020 0043  Gross per 24 hour   Intake 2657 ml   Output 2600 ml   Net 57 ml       PHYSICAL EXAM:  General Appearance: awake, alert, oriented, in no acute distress  HEENT:  Normocephalic, atraumatic, mucus membranes moist   Heart: RRR, S1-S2  Lungs: Equal chest rise and fall, no increased work of breathing, no audible wheeze  Abdomen: Abdomen soft, flat, nontender, non-peritoneal, no bulge in right groin - bolster in place  Extremities: No cyanosis, pitting edema, rashes noted. Skin: Skin color, texture, turgor normal. No rashes or lesions. IV Access: PIV  Mendieta:  In place and draining to bedside    Data:  CBC with Differential:    Lab Results   Component Value Date    WBC 13.9 08/30/2020    RBC 5.98 08/30/2020    HGB 16.6 08/30/2020    HCT 52.4 08/30/2020     08/30/2020    MCV 87.6 08/30/2020    MCH 27.8 08/30/2020    MCHC 31.7 08/30/2020    RDW 13.5 08/30/2020    LYMPHOPCT 12 08/30/2020    LYMPHOPCT 22.7 11/16/2019    MONOPCT 9 08/30/2020    MONOPCT 7.4 11/16/2019    EOSPCT 0.9 11/16/2019    BASOPCT 0 08/30/2020    BASOPCT 0.7 11/16/2019    MONOSABS 1.29 08/30/2020    MONOSABS 0.6 11/16/2019    LYMPHSABS 1.59 08/30/2020    LYMPHSABS 1.8 11/16/2019    EOSABS <0.03 08/30/2020    EOSABS 0.1 11/16/2019    BASOSABS 0.03 08/30/2020    DIFFTYPE NOT REPORTED 08/30/2020     CMP:    Lab Results   Component Value Date     08/30/2020    K 3.9 08/30/2020     08/30/2020    CO2 28 08/30/2020    BUN 22 08/30/2020    CREATININE 1.31 08/30/2020    GFRAA >60 08/30/2020    LABGLOM 58 08/30/2020    GLUCOSE 110 08/30/2020    PROT 8.6 08/29/2020    LABALBU 4.1 08/29/2020    CALCIUM 8.4 08/30/2020    BILITOT 1.48 08/29/2020    ALKPHOS 94 08/29/2020    AST 29 08/29/2020    ALT 47 08/29/2020       Radiology Review:  Xr Abdomen (kub) (single Ap View)    Result Date: 8/30/2020  1. Prominent dilated air-filled loops of small bowel throughout the abdomen, which appear increased in number when compared to the prior exam. 2. However, air-filled loops of colon are also seen. Ct Abdomen Pelvis W Iv Contrast    Result Date: 8/29/2020  1. High-grade small bowel obstruction with transition point at small bowel containing right inguinal hernia. 2. Mild wall thickening of the distal esophagus. Recommend clinical correlation for esophagitis. ASSESSMENT:  Active Hospital Problems    Diagnosis Date Noted    [facility] [K56.609] 08/29/2020       45 y.o. male with abdominal pain, SBO secondary to recurrent right inguinal hernia    Plan:    · NPO  · Keep NG to LIWS in the event pt will return to OR today  · Hold any AC for this am.  · Check AM labs - cbc/bmp. Leukocytosis was decreasing consistently. · Continue to keep bolster in place - no recurrence this am.  · Will discuss with Dr. Leana Horne - possible OR for later today. Plans TBD. Electronically signed by Kelvin Francois DO  on 8/31/2020 at 4:44 AM     Attending Physician Statement  I have discussed the case, including pertinent history and exam findings with the resident. I agree with the assessment, plan and orders as documented by the resident. Patient seen and examined. Agree with above.   OR today for hopeful robotic assisted laparoscopic repair of this recurrent inguinal hernia

## 2020-08-31 NOTE — PROGRESS NOTES
Avinash Ventura is a 45 y.o. male patient. Current Facility-Administered Medications   Medication Dose Route Frequency Provider Last Rate Last Dose    0.9% NaCl with KCl 40 mEq infusion   Intravenous Continuous Harpal Pires  mL/hr at 08/31/20 1052      fentaNYL (SUBLIMAZE) injection 25 mcg  25 mcg Intravenous Q4H PRN Harpal Pires MD        0.9 % sodium chloride infusion   Intravenous Continuous Harpal Pires MD   Stopped at 08/31/20 1051    ondansetron (ZOFRAN-ODT) disintegrating tablet 4 mg  4 mg Oral Q6H PRN Harpal Pires MD        Or    [facility]dans[facility]ron [facility] injection 4 mg  4 mg Intravenous Q6H PRN Harpal Pires MD        piperacillin-tazobactam (ZOSYN) 3.375 g in dextrose 5 % 50 mL IVPB extended infusion (mini-bag)  3.375 g Intravenous Christian Rogers MD   Stopped at 08/31/20 0465    fentaNYL (SUBLIMAZE) injection 50 mcg  50 mcg Intravenous Once Freada Hodgkin, MD        sodium chloride flush 0.9 % injection 10 mL  10 mL Intravenous 2 times per day Harpal Pires MD        sodium chloride flush 0.9 % injection 10 mL  10 mL Intravenous PRN Harpal Pires MD        acetaminophen (TYLENOL) tablet 650 mg  650 mg Oral Q4H PRN Harpal Pires MD         No Known Allergies  Active Problems:    SBO (small bowel obstruction) ([facility]  Resolved Problems:    * No resolved hospital problems. *    Blood pressure 115/70, pulse 80, temperature 98.2 °F (36.8 °C), temperature source Oral, resp. rate 18, height 5' 7\" (1.702 m), weight 180 lb (81.6 kg), SpO2 97 %. Subjective:  Symptoms:  Improved. He reports malaise, weakness and anorexia. No shortness of breath, cough, chest pain, headache, chest pressure, diarrhea or anxiety. Diet:  NPO. No nausea or vomiting. Activity level: Impaired due to weakness. Pain:  He complains of pain that is moderate. He reports pain is improving. Pain is requiring pain medication.       Objective:  General Appearance:  Uncomfortable, ill-appearing, in no acute distress and not in pain. Vital signs: (most recent): Blood pressure 115/70, pulse 80, temperature 98.2 °F (36.8 °C), temperature source Oral, resp. rate 18, height 5' 7\" (1.702 m), weight 180 lb (81.6 kg), SpO2 97 %. Vital signs are normal.  No fever. Output: Producing urine and no stool output. HEENT: Normal HEENT exam.    Lungs:  Normal effort and normal respiratory rate. Breath sounds clear to auscultation. He is not in respiratory distress. No stridor. No rales, decreased breath sounds, wheezes or rhonchi. Heart: Normal rate. Regular rhythm. S1 normal and S2 normal.  No murmur, gallop or friction rub. Chest: Symmetric chest wall expansion. No chest wall tenderness. Abdomen: Abdomen is soft and distended. (Right inguinal hernia has been reduced and has not recurred, Bolster and scrotal support as well as Ice pack in place. ). Hypoactive bowel sounds. There is no abdominal tenderness. Extremities: Normal range of motion. Pulses: Distal pulses are intact. There are no decreased pulses. Neurological: Patient is alert and oriented to person, place and time. Pupils:  Pupils are equal, round, and reactive to light. Skin:  Warm and dry. Assessment:    Condition: In serious condition. Improving. (Acute Abdominal pain / SBO  Acute incarcerated Inguinlal Hernia on right- reduced  Acute Renal failure- resolved  Acute Urinary retention- mera in place  Hypokalemia- from NGT suctioning and GI loss  Leucocytosis with left shift- improved  Dehydration- resolved  MRDD/Autism Disorder  Tourettes Syndrome    ). Plan:   Ad alexa activity. Start/continue incentive spirometry. Consults: general surgery and . NPO. Administer medications as ordered. (Cont NGT Suctioning, continues to drain  Cont Mera  IV hydration with ND  Replace K Intravenously  Cont IV Zofran, WBC count down but still with Let shift  Cont Monitoring CBC and BMP, improving).

## 2020-08-31 NOTE — ANESTHESIA PRE PROCEDURE
ondansetron (ZOFRAN) injection 4 mg  4 mg Intravenous Q6H PRN Mira Jackson MD        piperacillin-tazobactam (ZOSYN) 3.375 g in dextrose 5 % 50 mL IVPB extended infusion (mini-bag)  3.375 g Intravenous Q8H Mira Jackson MD 12.5 mL/hr at 08/31/20 1304 3.375 g at 08/31/20 1304    fentaNYL (SUBLIMAZE) injection 50 mcg  50 mcg Intravenous Once Dorys Mobley MD        sodium chloride flush 0.9 % injection 10 mL  10 mL Intravenous 2 times per day Mira Jackson MD        sodium chloride flush 0.9 % injection 10 mL  10 mL Intravenous PRN Mira Jackson MD        acetaminophen (TYLENOL) tablet 650 mg  650 mg Oral Q4H PRN Mira Jackson MD           Allergies:  No Known Allergies    Problem List:    Patient Active Problem List   Diagnosis Code    Tourette syndrome F95.2    MR (mental retardation), moderate F71    Impulse control disorder F63.9    Vitamin D deficiency E55.9    Partial symptomatic epilepsy with complex partial seizures, not intractable, without status epilepticus ([address] G40.209    Non-recurrent unilateral inguinal hernia K40.90    Tourette's disorder F95.2    Irreducible right inguinal hernia K40.30    Small bowel obstruction ([address] K56.609    Dehydration E86.0    Bandemia D72.825    Acute renal failure ([address] N17.9    SBO (small bowel obstruction) ([address] K56.609       Past Medical History:        Diagnosis Date    Heart murmur     Impulse control disorder     MR (mental retardation), moderate     Tourette syndrome        Past Surgical History:        Procedure Laterality Date    HERNIA REPAIR  02/28/2020       Social History:    Social History     Tobacco Use    Smoking status: Never Smoker    Smokeless tobacco: Never Used   Substance Use Topics    Alcohol use: No     Alcohol/week: 0.0 standard drinks                                Counseling given: Not Answered      Vital Signs (Current):   Vitals:    08/31/20 0444 08/31/20 0709 08/31/20 0715 08/31/20 1207   BP:  119/67 (!) 125/57 115/70   Pulse:  86 83 80   Resp:  18 18 18   Temp:  97.7 °F (36.5 °C) 98.2 °F (36.8 °C) 98.2 °F (36.8 °C)   TempSrc:  Oral Oral Oral   SpO2:  95% 96% 97%   Weight: 180 lb (81.6 kg)      Height:                                                  BP Readings from Last 3 Encounters:   08/31/20 115/70   08/27/20 117/75   07/14/20 123/74       NPO Status:                                                                                 BMI:   Wt Readings from Last 3 Encounters:   08/31/20 180 lb (81.6 kg)   08/27/20 154 lb (69.9 kg)   07/14/20 160 lb 3.2 oz (72.7 kg)     Body mass index is 28.19 kg/m². CBC:   Lab Results   Component Value Date    WBC 11.3 08/31/2020    RBC 5.75 08/31/2020    HGB 16.2 08/31/2020    HCT 50.3 08/31/2020    MCV 87.5 08/31/2020    RDW 13.4 08/31/2020     08/31/2020       CMP:   Lab Results   Component Value Date     08/31/2020    K 3.6 08/31/2020     08/31/2020    CO2 26 08/31/2020    BUN 19 08/31/2020    CREATININE 1.05 08/31/2020    GFRAA >60 08/31/2020    LABGLOM >60 08/31/2020    GLUCOSE 97 08/31/2020    PROT 8.6 08/29/2020    CALCIUM 8.5 08/31/2020    BILITOT 1.48 08/29/2020    ALKPHOS 94 08/29/2020    AST 29 08/29/2020    ALT 47 08/29/2020       POC Tests: No results for input(s): POCGLU, POCNA, POCK, POCCL, POCBUN, POCHEMO, POCHCT in the last 72 hours.     Coags: No results found for: PROTIME, INR, APTT    HCG (If Applicable): No results found for: PREGTESTUR, PREGSERUM, HCG, HCGQUANT     ABGs: No results found for: PHART, PO2ART, XMQ8EPZ, ZPU0JQC, BEART, A8KHFQXG     Type & Screen (If Applicable):  No results found for: LABABO, LABRH    Drug/Infectious Status (If Applicable):  No results found for: HIV, HEPCAB    COVID-19 Screening (If Applicable):   Lab Results   Component Value Date    COVID19 Not Detected 08/29/2020         Anesthesia Evaluation   no history of anesthetic complications:   Airway: Mallampati: II  TM distance: >3 FB   Neck ROM: full  Mouth opening: > = 3 FB Dental:          Pulmonary:                              Cardiovascular:        (-)  angina and  GOMEZ       Beta Blocker:  Not on Beta Blocker         Neuro/Psych:                ROS comment: Mild MR GI/Hepatic/Renal:            ROS comment: Bowel obstruction. Endo/Other:                     Abdominal:           Vascular:                                        Anesthesia Plan      general     ASA 3       Induction: intravenous. Anesthetic plan and risks discussed with patient.                       Mena Che MD   8/31/2020

## 2020-09-01 PROBLEM — E87.0 HYPERNATREMIA: Status: ACTIVE | Noted: 2020-01-01

## 2020-09-01 NOTE — PROGRESS NOTES
Aysha Frye is a 45 y.o. male patient. Current Facility-Administered Medications   Medication Dose Route Frequency Provider Last Rate Last Dose    cyclobenzaprine (FLEXERIL) tablet 5 mg  5 mg Oral TID Eliza Sr, DO   5 mg at 09/01/20 1358    0.9% NaCl with KCl 40 mEq infusion   Intravenous Continuous Junior Palm,  mL/hr at 09/01/20 1555      morphine (PF) injection 2 mg  2 mg Intravenous Q4H PRN Junior Palm, DO        HYDROcodone-acetaminophen (NORCO) 5-325 MG per tablet 1 tablet  1 tablet Oral Q4H PRN Junior Palm, DO        ondansetron (ZOFRAN-ODT) disintegrating tablet 4 mg  4 mg Oral Q6H PRN Junior Palm, DO        Or    ondansetron [facility]) injection 4 mg  4 mg Intravenous Q6H PRN Junior Palm, DO        piperacillin-tazobactam (ZOSYN) 3.375 g in dextrose 5 % 50 mL IVPB extended infusion (mini-bag)  3.375 g Intravenous Q8H Junior Palm, DO   Stopped at 09/01/20 1549    fentaNYL (SUBLIMAZE) injection 50 mcg  50 mcg Intravenous Once Junior Palm, DO        sodium chloride flush 0.9 % injection 10 mL  10 mL Intravenous 2 times per day Junior Palm, DO   10 mL at 09/01/20 0954    sodium chloride flush 0.9 % injection 10 mL  10 mL Intravenous PRN Junior Palm, DO   10 mL at 09/01/20 1245    acetaminophen (TYLENOL) tablet 650 mg  650 mg Oral Q4H PRN Junior Palm, DO         No Known Allergies  Active Problems:    SBO (small bowel obstruction) ([facility])    Hypernatremia  Resolved Problems:    * No resolved hospital problems. *    Blood pressure 121/80, pulse 92, temperature 97.9 °F (36.6 °C), temperature source Oral, resp. rate 18, height 5' 7\" (1.702 m), weight 180 lb (81.6 kg), SpO2 94 %. Subjective:  Symptoms:  Improved. No shortness of breath, chest pain, chest pressure or anxiety. Diet:  Dietary issues: tolerating clear liquids. Activity level: Returning to normal.    Pain:  He reports pain is improving. Pain is well controlled.       Objective:  General Appearance: medications as ordered. (Cont IV hydration, replace IV K,monitor labs  IV antibiotics, WBC improved,still left shift. IV pain meds and Zofran, wean off to PO pain meds as needed  Surgery consult appreciated  Mendieta removed, watch for UOP and urology if any diff voiding persists  ).        Amira Galvez MD  9/1/2020

## 2020-09-01 NOTE — PLAN OF CARE
Problem: Pain:  Goal: Pain level will decrease  Description: Pain level will decrease  Outcome: Ongoing     Problem: Pain:  Goal: Control of acute pain  Description: Control of acute pain  Outcome: Ongoing     Problem: Pain:  Goal: Control of chronic pain  Description: Control of chronic pain  Outcome: Ongoing     Problem: Falls - Risk of:  Goal: Will remain free from falls  Description: Will remain free from falls  Outcome: Ongoing     Problem: Falls - Risk of:  Goal: Absence of physical injury  Description: Absence of physical injury  Outcome: Ongoing     Problem: Skin Integrity:  Goal: Will show no infection signs and symptoms  Description: Will show no infection signs and symptoms  Outcome: Ongoing     Problem: Skin Integrity:  Goal: Absence of new skin breakdown  Description: Absence of new skin breakdown  Outcome: Ongoing

## 2020-09-01 NOTE — PLAN OF CARE
Problem: Pain:  Goal: Pain level will decrease  Description: Pain level will decrease  9/1/2020 1117 by Jenise Napoles RN  Outcome: Ongoing  9/1/2020 0351 by Kavita Hurt RN  Outcome: Ongoing  Goal: Control of acute pain  Description: Control of acute pain  9/1/2020 1117 by Jenise Napoles RN  Outcome: Ongoing  9/1/2020 0351 by Kavita Hurt RN  Outcome: Ongoing  Goal: Control of chronic pain  Description: Control of chronic pain  9/1/2020 0351 by Kavita Hurt RN  Outcome: Ongoing     Problem: Falls - Risk of:  Goal: Will remain free from falls  Description: Will remain free from falls  9/1/2020 1117 by Jenise Napoles RN  Outcome: Ongoing  9/1/2020 0351 by Kavita Hurt RN  Outcome: Ongoing  Goal: Absence of physical injury  Description: Absence of physical injury  9/1/2020 1117 by Jenise Napoles RN  Outcome: Ongoing  9/1/2020 0351 by Kavita Hurt RN  Outcome: Ongoing     Problem: Skin Integrity:  Goal: Will show no infection signs and symptoms  Description: Will show no infection signs and symptoms  9/1/2020 1117 by Jenise Napoles RN  Outcome: Ongoing  9/1/2020 0351 by Kavita Hurt RN  Outcome: Ongoing  Goal: Absence of new skin breakdown  Description: Absence of new skin breakdown  9/1/2020 1117 by Jenise Napoles RN  Outcome: Ongoing  9/1/2020 0351 by Renee Gilbert RN  Outcome: Ongoing

## 2020-09-01 NOTE — PROGRESS NOTES
General Surgery:  Daily Progress Note                    PATIENT NAME: Flores Juarez     TODAY'S DATE: 9/1/2020, 4:41 AM    SUBJECTIVE:     Pt seen and examined at bedside. Having abdominal pain around incision sites. Has not ambulated since surgery. Has Mendieta catheter for urinary retention per primary. Afebrile. No sign of recurrent hernia. Tolerating ice chips. OBJECTIVE:   VITALS:  /77   Pulse 90   Temp 99.4 °F (37.4 °C) (Oral)   Resp 18   Ht 5' 7\" (1.702 m)   Wt 180 lb (81.6 kg)   SpO2 97%   BMI 28.19 kg/m²      INTAKE/OUTPUT:      Intake/Output Summary (Last 24 hours) at 9/1/2020 0441  Last data filed at 8/31/2020 2358  Gross per 24 hour   Intake 1400 ml   Output 1700 ml   Net -300 ml       PHYSICAL EXAM:  General Appearance: awake, alert, oriented  HEENT:  Normocephalic, atraumatic, mucus membranes dry  Heart: RRR  Lungs: No respiratory distress  Abdomen: Soft, nondistended, tender to palpation around incision sites, expected postoperatively. Extremities: No cyanosis, pitting edema, rashes noted. Skin: Skin color, texture, turgor normal. No rashes or lesions.       Data:  CBC with Differential:    Lab Results   Component Value Date    WBC 11.3 08/31/2020    RBC 5.75 08/31/2020    HGB 16.2 08/31/2020    HCT 50.3 08/31/2020     08/31/2020    MCV 87.5 08/31/2020    MCH 28.2 08/31/2020    MCHC 32.2 08/31/2020    RDW 13.4 08/31/2020    LYMPHOPCT 11 08/31/2020    LYMPHOPCT 22.7 11/16/2019    MONOPCT 9 08/31/2020    MONOPCT 7.4 11/16/2019    EOSPCT 0.9 11/16/2019    BASOPCT 0 08/31/2020    BASOPCT 0.7 11/16/2019    MONOSABS 1.04 08/31/2020    MONOSABS 0.6 11/16/2019    LYMPHSABS 1.21 08/31/2020    LYMPHSABS 1.8 11/16/2019    EOSABS <0.03 08/31/2020    EOSABS 0.1 11/16/2019    BASOSABS 0.03 08/31/2020    DIFFTYPE NOT REPORTED 08/31/2020     CMP:    Lab Results   Component Value Date     08/31/2020    K 3.6 08/31/2020     08/31/2020    CO2 26 08/31/2020    BUN 19 08/31/2020 CREATININE 1.05 08/31/2020    GFRAA >60 08/31/2020    LABGLOM >60 08/31/2020    GLUCOSE 97 08/31/2020    PROT 8.6 08/29/2020    LABALBU 4.1 08/29/2020    CALCIUM 8.5 08/31/2020    BILITOT 1.48 08/29/2020    ALKPHOS 94 08/29/2020    AST 29 08/29/2020    ALT 47 08/29/2020       Radiology Review:  Xr Abdomen (kub) (single Ap View)    Result Date: 8/30/2020  1. Prominent dilated air-filled loops of small bowel throughout the abdomen, which appear increased in number when compared to the prior exam. 2. However, air-filled loops of colon are also seen. Ct Abdomen Pelvis W Iv Contrast    Result Date: 8/29/2020  1. High-grade small bowel obstruction with transition point at small bowel containing right inguinal hernia. 2. Mild wall thickening of the distal esophagus. Recommend clinical correlation for esophagitis. ASSESSMENT:  Active Hospital Problems    Diagnosis Date Noted    SBO (small bowel obstruction) ([facility] [address] [K56.609] 08/29/2020       45 y.o. male with abdominal pain, SBO secondary to recurrent right inguinal hernia    Plan:  1. Diet: N.p.o. with ice chips. Okay to start clear liquid diet today. 2. Pain control: Norco and morphine, added Flexeril  3. Okay to be up out of bed and ambulate today. 4. Okay from a surgical standpoint for Mendieta removal.  5. Complete Zosyn antibiotic therapy. 6. Medical management per primary team.  Will follow along. Electronically signed by Clay Carrizales DO  on 9/1/2020 at 4:41 AM     Attending Physician Statement  I have discussed the case, including pertinent history and exam findings with the resident. I agree with the assessment, plan and orders as documented by the resident. S/P robotic assisted laparoscopic recurrent inguinal hernia repair with mesh. Encourage OOB. Start liquid diet.

## 2020-09-02 NOTE — PROGRESS NOTES
General Surgery:  Daily Progress Note                    PATIENT NAME: Merlyn July     TODAY'S DATE: 9/2/2020, 6:10 AM    SUBJECTIVE:     Pt seen and examined at bedside. Ambulating. Pain controlled. Tolerated clears. No bm or flatus per patient. Mendieta in place for urinary retention. OBJECTIVE:   VITALS:  /81   Pulse 94   Temp 99.8 °F (37.7 °C) (Oral)   Resp 16   Ht 5' 7\" (1.702 m)   Wt 180 lb (81.6 kg)   SpO2 96%   BMI 28.19 kg/m²      INTAKE/OUTPUT:      Intake/Output Summary (Last 24 hours) at 9/2/2020 0610  Last data filed at 9/2/2020 0442  Gross per 24 hour   Intake 2963.62 ml   Output 1800 ml   Net 1163.62 ml       PHYSICAL EXAM:  General Appearance: awake, alert, oriented  HEENT:  Normocephalic, atraumatic, mucus membranes dry  Heart: RRR  Lungs: No respiratory distress  Abdomen: Soft, nondistended, tender to palpation around incision sites, expected postoperatively. Extremities: No cyanosis, pitting edema, rashes noted. Skin: Skin color, texture, turgor normal. No rashes or lesions.       Data:  CBC with Differential:    Lab Results   Component Value Date    WBC 11.5 09/01/2020    RBC 5.43 09/01/2020    HGB 14.9 09/01/2020    HCT 47.6 09/01/2020     09/01/2020    MCV 87.7 09/01/2020    MCH 27.4 09/01/2020    MCHC 31.3 09/01/2020    RDW 13.4 09/01/2020    LYMPHOPCT 11 09/01/2020    LYMPHOPCT 22.7 11/16/2019    MONOPCT 10 09/01/2020    MONOPCT 7.4 11/16/2019    EOSPCT 0.9 11/16/2019    BASOPCT 0 09/01/2020    BASOPCT 0.7 11/16/2019    MONOSABS 1.13 09/01/2020    MONOSABS 0.6 11/16/2019    LYMPHSABS 1.31 09/01/2020    LYMPHSABS 1.8 11/16/2019    EOSABS <0.03 09/01/2020    EOSABS 0.1 11/16/2019    BASOSABS <0.03 09/01/2020    DIFFTYPE NOT REPORTED 09/01/2020     CMP:    Lab Results   Component Value Date     09/01/2020    K 4.1 09/01/2020     09/01/2020    CO2 26 09/01/2020    BUN 19 09/01/2020    CREATININE 0.98 09/01/2020    GFRAA >60 09/01/2020    LABGLOM >60 09/01/2020    GLUCOSE 112 09/01/2020    PROT 8.6 08/29/2020    LABALBU 4.1 08/29/2020    CALCIUM 8.3 09/01/2020    BILITOT 1.48 08/29/2020    ALKPHOS 94 08/29/2020    AST 29 08/29/2020    ALT 47 08/29/2020       Radiology Review:  Xr Abdomen (kub) (single Ap View)    Result Date: 8/30/2020  1. Prominent dilated air-filled loops of small bowel throughout the abdomen, which appear increased in number when compared to the prior exam. 2. However, air-filled loops of colon are also seen. Ct Abdomen Pelvis W Iv Contrast    Result Date: 8/29/2020  1. High-grade small bowel obstruction with transition point at small bowel containing right inguinal hernia. 2. Mild wall thickening of the distal esophagus. Recommend clinical correlation for esophagitis. ASSESSMENT:  Active Hospital Problems    Diagnosis Date Noted    Hypernatremia [E87.0] 09/01/2020    SBO (small bowel obstruction) [facility]) [K56.609] 08/29/2020       45 y.o. male with abdominal pain, SBO secondary to recurrent right inguinal hernia 8/31    Plan:  1. Diet: ok to advance diet as tolerated  2. Pain control: Norco and morphine, added Flexeril  3. Okay to be up out of bed and ambulate today. 4. Okay from a surgical standpoint for Mendieta removal.  5. Medical management per primary team.  Will follow along. Ok to discharge from surgery standpoint      Electronically signed by Kevin Sanchez DO  on 9/2/2020 at 6:10 AM     Attending Physician Statement  I have discussed the case, including pertinent history and exam findings with the resident. I agree with the assessment, plan and orders as documented by the resident. Patient seen and examined  Agree with above. Advance diet. Suppository this am.  Senna BID.

## 2020-09-02 NOTE — PROGRESS NOTES
Pt. Transferred to ICU, bed 1107. Additional report given at bedside. Patient remains alert and oriented and denies any pain or distress.

## 2020-09-02 NOTE — CARE COORDINATION
Pt had inguinal hernia repair 8-31-20 with Dr. Ladi Evans. Pt developed acute hypoxic respiratory failure and bilateral PE today. Will be transferring to ICU when bed available.

## 2020-09-02 NOTE — CONSULTS
[facility]  Urology Consultation    Patient:  Maciej Christina  MRN: [id number]  YOB: 1970    CHIEF COMPLAINT:  Urinary retention    HISTORY OF PRESENT ILLNESS:   The patient is a 45 y.o. male who presents with  Small-bowel obstruction, the patient failed trial of voiding yesterday, greater than 500 mL residual, patient required Mendieta reinsertion. Patient at present time  Is sitting in bed, not in acute distress, I discussed with the patient any prior urologic history or prior indwelling Mendieta the patient denied    He is tolerating the catheter well    Patient    Patient's old records, notes and chart reviewed and summarized above. Past Medical History:    Past Medical History:   Diagnosis Date    Heart murmur     Impulse control disorder     MR (mental retardation), moderate     Tourette syndrome        Past Surgical History:    Past Surgical History:   Procedure Laterality Date    HERNIA REPAIR  02/28/2020    HERNIA REPAIR  08/31/2020    Robotic Recurrent Right Inguinal Hernia Repair with Mesh    HERNIA REPAIR Right 8/31/2020    RIGHT HERNIA INGUINAL REPAIR LAPAROSCOPIC ROBOTIC XI performed by Faustino Woods DO at [facility] OR     Previous  surgery: none     Medications:    Scheduled Meds:   cyclobenzaprine  5 mg Oral TID    fentanNYL  50 mcg Intravenous Once    sodium chloride flush  10 mL Intravenous 2 times per day     Continuous Infusions:   0.9% NaCl with KCl 40 mEq 100 mL/hr at 09/02/20 0135     PRN Meds:.morphine, HYDROcodone 5 mg - acetaminophen, ondansetron **OR** ondansetron, sodium chloride flush, acetaminophen    Allergies:  Patient has no known allergies.     Social History:    Social History     Socioeconomic History    Marital status: Single     Spouse name: Not on file    Number of children: Not on file    Years of education: Not on file    Highest education level: Not on file   Occupational History    Not on file   Social Needs    Financial resource strain: Not on file    Food insecurity     Worry: Not on file     Inability: Not on file    Transportation needs     Medical: Not on file     Non-medical: Not on file   Tobacco Use    Smoking status: Never Smoker    Smokeless tobacco: Never Used   Substance and Sexual Activity    Alcohol use: No     Alcohol/week: 0.0 standard drinks    Drug use: No    Sexual activity: Never   Lifestyle    Physical activity     Days per week: Not on file     Minutes per session: Not on file    Stress: Not on file   Relationships    Social connections     Talks on phone: Not on file     Gets together: Not on file     Attends [employer] service: Not on file     Active member of club or organization: Not on file     Attends meetings of clubs or organizations: Not on file     Relationship status: Not on file    Intimate partner violence     Fear of current or ex partner: Not on file     Emotionally abused: Not on file     Physically abused: Not on file     Forced sexual activity: Not on file   Other Topics Concern    Not on file   Social History Narrative    Not on file       Family History:    Family History   Problem Relation Age of Onset    Breast Cancer Mother      Previous Urologic Family history: none    REVIEW OF SYSTEMS:  Constitutional: negative  Eyes: negative  Respiratory: negative  Cardiovascular: negative  Gastrointestinal: see HPI2  Genitourinary: see HPI  Musculoskeletal: negative  Skin: negative   Neurological: negative  Hematological/Lymphatic: negative  Psychological: negative    Physical Exam:    This a 45 y.o. male   Patient Vitals for the past 24 hrs:   BP Temp Temp src Pulse Resp SpO2   09/02/20 0030 118/81 99.8 °F (37.7 °C) Oral 94 16 96 %   09/01/20 1915 120/74 99.8 °F (37.7 °C) Oral 99 16 98 %   09/01/20 0652 121/80 97.9 °F (36.6 °C) Oral 92 18 94 %     Constitutional: Patient in no acute distress; Neuro: alert and oriented to person place and time.     Psych: Mood and affect normal.  Skin: Normal  Lungs: Respiratory effort normal  Cardiovascular:  Normal peripheral pulses  Abdomen: Soft, non-tender, non-distended with no CVA, flank pain, hepatosplenomegaly or hernia. Kidneys normal.  Bladder non-tender and not distended. Lymphatics: no palpable lymphadenopathy  Penis normal and circumcised  Urethral meatus normal  .  Bladder decompressed urine clear  LABS:  Recent Labs     08/31/20  0551 09/01/20  0509   WBC 11.3 11.5*   HGB 16.2 14.9   HCT 50.3 47.6   MCV 87.5 87.7    132*     Recent Labs     08/31/20  0551 09/01/20  0509   * 150*   K 3.6* 4.1   * 114*   CO2 26 26   BUN 19 19   CREATININE 1.05 0.98     Lab Results   Component Value Date    PSA 1.81 11/16/2019    PSA 2.22 08/13/2016       Additional Lab/culture results:    Urinalysis: No results for input(s): COLORU, PHUR, LABCAST, WBCUA, RBCUA, MUCUS, TRICHOMONAS, YEAST, BACTERIA, CLARITYU, SPECGRAV, LEUKOCYTESUR, UROBILINOGEN, Anam Gosling in the last 72 hours. Invalid input(s): NITRATE, GLUCOSEUKETONESUAMORPHOUS     -----------------------------------------------------------------  Imaging Results:    Assessment and Plan   Impression:    Patient Active Problem List   Diagnosis    Tourette syndrome    MR (mental retardation), moderate    Impulse control disorder    Vitamin D deficiency    Partial symptomatic epilepsy with complex partial seizures, not intractable, without status epilepticus ([address]    Non-recurrent unilateral inguinal hernia    Tourette's disorder    Irreducible right inguinal hernia    Small bowel obstruction (HCC)    Dehydration    Bandemia    Acute renal failure (HCC)    SBO (small bowel obstruction) ([address]    Hypernatremia       Plan:   At the present time we intend to give the patient a trial of voiding tomorrow if stable was started today on Flomax we will monitor accordingly    Billy Lorenzo  5:37 AM 9/2/2020

## 2020-09-02 NOTE — FLOWSHEET NOTE
09/02/20 1245   Vital Signs   Temp 98.3 °F (36.8 °C)   Temp Source Axillary   Pulse 131   Heart Rate Source Monitor   Resp (!) 31   /68   BP Location Left Arm   BP Upper/Lower Upper   MAP (mmHg) 80   Patient Position Semi fowlers   Level of Consciousness 0   MEWS Score 6   Oxygen Therapy   SpO2 92 %   Pulse Oximeter Device Mode Intermittent   Pulse Oximeter Device Location Left;Finger     Acute change in vital signs, patient asymptomatic Dr. Harika Ramos notified. Orders received for ABG's oxygen, telemetry, and to notify Dr. Mindi Quezada.

## 2020-09-02 NOTE — PROGRESS NOTES
Dr. Cindy Arredondo to bedside, spoke to radiologist to review CT scan. See orders. POC discussed with patient.

## 2020-09-02 NOTE — PROGRESS NOTES
Dr. Jarred Cisneros in to see patient. Discussed poc.  Orders received to start flomax and remove mera tomorrow morning for another void trial.

## 2020-09-02 NOTE — PLAN OF CARE
Problem: Pain:  Goal: Pain level will decrease  Description: Pain level will decrease  9/2/2020 1018 by Shruti Poole RN  Outcome: Ongoing  9/2/2020 0304 by Radha Burgos RN  Outcome: Ongoing  Goal: Control of acute pain  Description: Control of acute pain  9/2/2020 1018 by Shruti Poole RN  Outcome: Ongoing  9/2/2020 0304 by Radha Burgos RN  Outcome: Ongoing  Goal: Control of chronic pain  Description: Control of chronic pain  9/2/2020 1018 by Shruti Poole RN  Outcome: Ongoing  9/2/2020 0304 by Radha Burgos RN  Outcome: Ongoing     Problem: Falls - Risk of:  Goal: Will remain free from falls  Description: Will remain free from falls  9/2/2020 1018 by Shruti Poole RN  Outcome: Ongoing  9/2/2020 0304 by Radha Burgos RN  Outcome: Ongoing  Goal: Absence of physical injury  Description: Absence of physical injury  9/2/2020 1018 by Shruti Poole RN  Outcome: Ongoing  9/2/2020 0304 by Radha Burgos RN  Outcome: Ongoing     Problem: Skin Integrity:  Goal: Will show no infection signs and symptoms  Description: Will show no infection signs and symptoms  9/2/2020 1018 by Shruti Poole RN  Outcome: Ongoing  9/2/2020 0304 by Radha Burgos RN  Outcome: Ongoing  Goal: Absence of new skin breakdown  Description: Absence of new skin breakdown  9/2/2020 1018 by Shruti Poole RN  Outcome: Ongoing  9/2/2020 0304 by Radha Burgos RN  Outcome: Ongoing

## 2020-09-02 NOTE — PROGRESS NOTES
Aysha Frye is a 45 y.o. male patient. Current Facility-Administered Medications   Medication Dose Route Frequency Provider Last Rate Last Dose    senna (SENOKOT) tablet 8.6 mg  1 tablet Oral BID Anamika Blackburn MD   8.6 mg at 09/02/20 0823    cyclobenzaprine (FLEXERIL) tablet 5 mg  5 mg Oral TID Eliza Sr, DO   5 mg at 09/02/20 0824    0.9% NaCl with KCl 40 mEq infusion   Intravenous Continuous Junior Palm,  mL/hr at 09/02/20 0135      morphine (PF) injection 2 mg  2 mg Intravenous Q4H PRN Junior Palm, DO        HYDROcodone-acetaminophen [facility]) 5-325 MG per tablet 1 tablet  1 tablet Oral Q4H PRN Junior Palm, DO   1 tablet at 09/02/20 8663    ondansetron (ZOFRAN-ODT) disintegrating tablet 4 mg  4 mg Oral Q6H PRN Junior Palm, DO        Or    ondansetron [facility]) injection 4 mg  4 mg Intravenous Q6H PRN Junior Palm, DO        fentaNYL (SUBLIMAZE) injection 50 mcg  50 mcg Intravenous Once Junior Palm, DO        sodium chloride flush 0.9 % injection 10 mL  10 mL Intravenous 2 times per day Junior Palm, DO   10 mL at 09/01/20 0954    sodium chloride flush 0.9 % injection 10 mL  10 mL Intravenous PRN Junior Palm, DO   10 mL at 09/01/20 1245    acetaminophen (TYLENOL) tablet 650 mg  650 mg Oral Q4H PRN Junior Palm, DO         No Known Allergies  Active Problems:    SBO [facility] obstruction) ([facility])    Hypernatremia  Resolved Problems:    * No resolved hospital problems. *    Blood pressure 122/78, pulse 89, temperature 97.4 °F (36.3 °C), temperature source Oral, resp. rate 16, height 5' 7\" (1.702 m), weight 180 lb (81.6 kg), SpO2 97 %. Subjective:  Symptoms:  Improved. No shortness of breath, malaise, cough, chest pain, weakness, headache, chest pressure, anorexia, diarrhea or anxiety. Diet:  Poor intake. No nausea or vomiting. Activity level: Returning to normal.    Pain:  He complains of pain that is mild. He reports pain is improving. Pain is well controlled.

## 2020-09-02 NOTE — PROGRESS NOTES
Call placed to NP Sugar Saavedra to discuss new consult. Orders received to transfer patient to [facility] after CTA. She will notify Dr. Chris Mathew to see patient when rounding.

## 2020-09-02 NOTE — CONSULTS
Pulmonary Medicine and Critical Care Consult  [facility] APRN-CNP/Xavier Tang MD      Patient - Bhavik Melendez   MRN -  [id number]   [facility] [[facility]   - 1970      Date of Admission -  2020 10:03 AM  Date of evaluation -  2020  Room -    Cruz Lind MD Primary Care Physician - Iliana Balderas MD     Reason for Consult    Acute hypoxic respiratory failure, PE    Assessment   · Acute hypoxic respiratory failure  · Bilateral pulmonary embolism  · Bilateral pleural effusion/mild pulmonary edema  · Small bowel obstruction secondary to recurrent right inguinal hernia, s/p robotic right inguinal hernia repair with mesh 20  · Borderline pulmonary hypertension, RVSP 37 mmHg   · Suspected obstructive sleep apnea/Obesity  · Urinary retention  · MICHAEL / hypernatremia   · MR, Tourettes syndrome, epilepsy     Recommendations   · High intensity IV heparin  · Lower extremity Doppler  · 2D echo  · Moved to ICU  · We will consider diuresis, once tachycardia improves  · Oxygen via nasal cannula   · BiPAP if necessary   · Albuterol and Ipratropium Q 4 hours and prn  · Incentive spirometry every hour while awake  · X-ray chest in am  · Labs: CBC and BMP in am  · DVT prophylaxis with low molecular weight heparin  · Discussed with radiology  · Discussed with RN  · Will follow with you    Problem List      Patient Active Problem List   Diagnosis    Tourette syndrome    MR (mental retardation), moderate    Impulse control disorder    Vitamin D deficiency    Partial symptomatic epilepsy with complex partial seizures, not intractable, without status epilepticus ([address]    Non-recurrent unilateral inguinal hernia    Tourette's disorder    Irreducible right inguinal hernia    Small bowel obstruction ([address]    Dehydration    Bandemia    Acute renal failure ([address]    SBO (small bowel obstruction) ([address]    Hypernatremia       HPI     Bhavik Melendez is 45 y.o., male, admitted because of small bowel obstruction. Obstruction was secondary to recurrent right inguinal hernia. He underwent robotic repair of hernia on 8/31/20 and had been doing fairly well until this afternoon. On his noon vitals he was found to be tachypneic and tachycardic. Dr. Nina Wells was notified and ordered ABGs which showed PO2 of 49.       PMHx   Past Medical History      Diagnosis Date    Heart murmur     Impulse control disorder     MR (mental retardation), moderate     Tourette syndrome       Past Surgical History        Procedure Laterality Date    HERNIA REPAIR  02/28/2020    HERNIA REPAIR  08/31/2020    Robotic Recurrent Right Inguinal Hernia Repair with Mesh    HERNIA REPAIR Right 8/31/2020    RIGHT HERNIA INGUINAL REPAIR LAPAROSCOPIC ROBOTIC XI performed by Capri Valle DO at [facility] OR       [facility]    Current Medications    senna  1 tablet Oral BID    tamsulosin  0.4 mg Oral Daily    cyclobenzaprine  5 mg Oral TID    sodium chloride flush  10 mL Intravenous 2 times per day     HYDROcodone 5 mg - acetaminophen, ondansetron **OR** [DISCONTINUED] ondansetron, sodium chloride flush, acetaminophen  IV Drips/Infusions   0.9% NaCl with KCl 40 mEq 50 mL/hr at 09/02/20 1306     Home Medications  Medications Prior to Admission: ondansetron (ZOFRAN) 4 MG tablet, Take 1 tablet by mouth 3 times daily as needed for Nausea or Vomiting  ciprofloxacin (CIPRO) 500 MG tablet, Take 1 tablet by mouth 2 times daily for 10 days  metroNIDAZOLE (FLAGYL) 500 MG tablet, Take 1 tablet by mouth 3 times daily for 10 days  topiramate (TOPAMAX) 100 MG tablet, TAKE 1 TABLET BY MOUTH TWICE DAILY FOR SEIZURES  Cholecalciferol (VITAMIN D) 50 MCG (2000 UT) CAPS capsule, Take 1 capsule by mouth daily (Patient not taking: Reported on 8/27/2020)  topiramate (TOPAMAX) 50 MG tablet, TAKE ONE DAILY FOR ONE WEEK , THEN ONE TWICE DAILY FOR ONE WEEK, THEN ONE IN THE AM AND 2 IN THE OM FOR ONE WEEK, THEN START 100 MG TABLET TWICE DAILY (Patient not taking: Reported on 8/27/2020)    Allergies    Patient has no known allergies. Social History     Social History     Tobacco Use    Smoking status: Never Smoker    Smokeless tobacco: Never Used   Substance Use Topics    Alcohol use: No     Alcohol/week: 0.0 standard drinks     Family History          Problem Relation Age of Onset    Breast Cancer Mother      ROS - 6 systems   General Denies any fever or chills  HEENT Denies any diplopia, tinnitus or vertigo  Resp positive for  dyspnea  Cardiac Denies any chest pain, palpitations, claudication or edema  GI Denies any melena, hematochezia, hematemesis or pyrosis   Denies any frequency, urgency, hesitancy or incontinence  Heme Denies bruising or bleeding easily  Endocrine Denies any history of diabetes or thyroid disease  Neuro Denies any focal motor or sensory deficits  Psychiatric Denies anxiety, depression, suicidal ideation  Skin Denies rashes, itching, open sores  Vitals     height is 5' 7\" (1.702 m) and weight is 180 lb (81.6 kg). His axillary temperature is 98.3 °F (36.8 °C). His blood pressure is 118/80 and his pulse is 125. His respiration is 31 (abnormal) and oxygen saturation is 95%. Body mass index is 28.19 kg/m². I/O        Intake/Output Summary (Last 24 hours) at 9/2/2020 1444  Last data filed at 9/2/2020 1357  Gross per 24 hour   Intake 1319.62 ml   Output 1525 ml   Net -205.38 ml     I/O last 3 completed shifts: In: 2963.6 [P.O.:960; I.V.:1906.2; IV Piggyback:97.4]  Out: 1800 [Urine:1800]   Patient Vitals for the past 96 hrs (Last 3 readings):   Weight   08/31/20 0444 180 lb (81.6 kg)   08/30/20 0416 176 lb 3.2 oz (79.9 kg)   08/29/20 1603 170 lb 1.6 oz (77.2 kg)     Exam   General Appearance   Awake, alert, oriented, in no acute distress  HEENT - Head is normocephalic, atraumatic.  Pupil reactive to light  Neck - Supple, symmetrical, trachea midline and Soft, trachea midline and straight  Lungs - positive findings: Cardiovascular - Heart sounds are normal.  Regular rhythm normal rate without murmur, gallop or rub. Abdomen - Soft, nontender, nondistended, no masses or organomegaly  Neurologic - CN II-XII are grossly intact. There are no focal motor or sensory deficits  Skin - No bruising or bleeding  Extremities - No cyanosis, clubbing or edema    Labs  - Old records and notes have been reviewed in [facility]   CBC     Lab Results   Component Value Date    WBC 11.2 09/02/2020    RBC 5.04 09/02/2020    HGB 14.0 09/02/2020    HCT 44.4 09/02/2020     09/02/2020    MCV 88.1 09/02/2020    MCH 27.8 09/02/2020    MCHC 31.5 09/02/2020    RDW 13.3 09/02/2020    LYMPHOPCT 18 09/02/2020    LYMPHOPCT 22.7 11/16/2019    MONOPCT 8 09/02/2020    MONOPCT 7.4 11/16/2019    EOSPCT 0.9 11/16/2019    BASOPCT 0 09/02/2020    BASOPCT 0.7 11/16/2019    MONOSABS 0.92 09/02/2020    MONOSABS 0.6 11/16/2019    LYMPHSABS 2.04 09/02/2020    LYMPHSABS 1.8 11/16/2019    EOSABS 0.08 09/02/2020    EOSABS 0.1 11/16/2019    BASOSABS 0.03 09/02/2020    DIFFTYPE NOT REPORTED 09/02/2020     BMP   Lab Results   Component Value Date     09/02/2020    K 4.0 09/02/2020     09/02/2020    CO2 26 09/02/2020    BUN 12 09/02/2020    CREATININE 0.99 09/02/2020    GLUCOSE 123 09/02/2020    CALCIUM 8.2 09/02/2020     LFTS  Lab Results   Component Value Date    ALKPHOS 94 08/29/2020    ALT 47 08/29/2020    AST 29 08/29/2020    PROT 8.6 08/29/2020    BILITOT 1.48 08/29/2020    BILIDIR 0.29 08/29/2020    IBILI 1.19 08/29/2020    LABALBU 4.1 08/29/2020     ABG   Lab Results   Component Value Date    HDE1SPC 25 09/02/2020     Radiology    CT Scans  9/2/2020    (See actual reports for details)    \"Thank you for asking us to see this patient\"    Case discussed with nurse and patient. Questions and concerns addressed.     Electronically signed by     HARESH Sanchez  Pulmonary Critical Care and Sleep Medicine,  Patient seen under the supervision of [facility] MD Kitty, [facility]

## 2020-09-03 PROBLEM — J96.01 ACUTE RESPIRATORY FAILURE WITH HYPOXIA (HCC): Status: ACTIVE | Noted: 2020-01-01

## 2020-09-03 PROBLEM — D69.6 THROMBOCYTOPENIA (HCC): Status: ACTIVE | Noted: 2020-01-01

## 2020-09-03 PROBLEM — I26.99 ACUTE PULMONARY EMBOLISM (HCC): Status: ACTIVE | Noted: 2020-01-01

## 2020-09-03 NOTE — PROGRESS NOTES
Call was placed to [facility] in regards to possible consent for central line placement. No one picked up, RN was unable to leave voicemail because mailbox is full.

## 2020-09-03 NOTE — PROGRESS NOTES
Surgery:  Patient seen with Critical Care in CT scan. Acute drop in H/H after episode of PEA and Code Blue. Patient received TPA and is currently profoundly coagulopathic. CT does demonstrate suspected hemoperitoneum and as expected fluid in the surgical field. Given his recent TPA and coagulopathy, the patient is not a candidate for surgical intervention at this point. He would not survive this additional insult. Unfortunately he is at risk with or without anticoagulation for on one hand bleeding and on the other hand propagation of clot/PE.

## 2020-09-03 NOTE — PROGRESS NOTES
General Surgery:  Daily Progress Note                    PATIENT NAME: Alo Cagle     TODAY'S DATE: 9/3/2020, 4:52 AM    SUBJECTIVE:     Pt seen and examined at bedside. Tachycardic and tachypneic yesterday and was found to have bilateral PEs. Was transferred to ICU on a heparin drip. Patient denies nausea, vomiting. Still has abdominal pain. Had a bowel movement overnight. Passing gas. OBJECTIVE:   VITALS:  /83   Pulse 103   Temp 98.2 °F (36.8 °C) (Temporal)   Resp 28   Ht 5' 7\" (1.702 m)   Wt 180 lb (81.6 kg)   SpO2 96%   BMI 28.19 kg/m²      INTAKE/OUTPUT:      Intake/Output Summary (Last 24 hours) at 9/3/2020 0452  Last data filed at 9/2/2020 2206  Gross per 24 hour   Intake --   Output 1725 ml   Net -1725 ml       PHYSICAL EXAM:  General Appearance: awake, alert, oriented  HEENT:  Normocephalic, atraumatic, mucus membranes dry  Heart: Tachycardic, regular rhythm  Lungs: Regular rate, has supplemental oxygen  Abdomen: Soft, slightly distended, generalized tenderness to palpation  Extremities: No cyanosis, pitting edema, rashes noted. Skin: Skin color, texture, turgor normal. No rashes or lesions.       Data:  CBC with Differential:    Lab Results   Component Value Date    WBC 13.5 09/02/2020    RBC 5.40 09/02/2020    HGB 15.1 09/02/2020    HCT 46.8 09/02/2020    PLT 98 09/02/2020    MCV 86.7 09/02/2020    MCH 28.0 09/02/2020    MCHC 32.3 09/02/2020    RDW 13.1 09/02/2020    LYMPHOPCT 18 09/02/2020    LYMPHOPCT 22.7 11/16/2019    MONOPCT 8 09/02/2020    MONOPCT 7.4 11/16/2019    EOSPCT 0.9 11/16/2019    BASOPCT 0 09/02/2020    BASOPCT 0.7 11/16/2019    MONOSABS 0.92 09/02/2020    MONOSABS 0.6 11/16/2019    LYMPHSABS 2.04 09/02/2020    LYMPHSABS 1.8 11/16/2019    EOSABS 0.08 09/02/2020    EOSABS 0.1 11/16/2019    BASOSABS 0.03 09/02/2020    DIFFTYPE NOT REPORTED 09/02/2020     CMP:    Lab Results   Component Value Date     09/02/2020    K 4.0 09/02/2020     09/02/2020    CO2 26 09/02/2020    BUN 12 09/02/2020    CREATININE 0.99 09/02/2020    GFRAA >60 09/02/2020    LABGLOM >60 09/02/2020    GLUCOSE 123 09/02/2020    PROT 8.6 08/29/2020    LABALBU 4.1 08/29/2020    CALCIUM 8.2 09/02/2020    BILITOT 1.48 08/29/2020    ALKPHOS 94 08/29/2020    AST 29 08/29/2020    ALT 47 08/29/2020       Radiology Review:      ASSESSMENT:  Active Hospital Problems    Diagnosis Date Noted    Hypernatremia [E87.0] 09/01/2020    SBO (small bowel obstruction) [facility]) [K56.609] 08/29/2020       45 y.o. male with abdominal pain, SBO secondary to recurrent right inguinal hernia     Status post robotic assisted laparoscopic inguinal hernia repair 8/31    Plan:  1. Diet: ok to advance diet as tolerated  2. Pain control: Norco and morphine, added Flexeril  3. Okay to be up out of bed and ambulate today. 4. Pulmonary embolism treatment per primary  5. Okay from a surgical standpoint for Mendieta removal.  6. Medical management per primary team.  Will follow along. Ok to discharge from surgery standpoint medically stable      Electronically signed by Eros Ward DO  on 9/3/2020 at 4:52 AM     Attending Physician Statement  I have discussed the case, including pertinent history and exam findings with the resident. I agree with the assessment, plan and orders as documented by the resident. Patient seen and examined. Agree with above. Continue with bowel stimulation. On anticoagulation secondary to recent diagnosis of pulmonary embolism. Supportive care.

## 2020-09-03 NOTE — PROGRESS NOTES
Monica Damian is a 45 y.o. male patient. Current Facility-Administered Medications   Medication Dose Route Frequency Provider Last Rate Last Dose    senna (SENOKOT) tablet 8.6 mg  1 tablet Oral BID Gio Pemberton MD   8.6 mg at 09/02/20 1918    tamsulosin (FLOMAX) capsule 0.4 mg  0.4 mg Oral Daily Maura Guo MD   Stopped at 09/02/20 1332    levalbuterol (Jeny Aashish) nebulizer solution 1.25 mg  1.25 mg Nebulization 4x daily Edward Vu MD   1.25 mg at 09/03/20 0751    ipratropium (ATROVENT) 0.02 % nebulizer solution 0.5 mg  0.5 mg Nebulization 4x daily Edward Vu MD   0.5 mg at 09/03/20 0751    heparin (porcine) injection 6,550 Units  80 Units/kg Intravenous PRN Edward Vu MD        heparin (porcine) injection 3,250 Units  40 Units/kg Intravenous PRN Edward Vu MD        heparin 25,000 units in dextrose 5% 250 mL infusion  18 Units/kg/hr Intravenous Continuous Edward Vu MD 14.7 mL/hr at 09/03/20 0154 18 Units/kg/hr at 09/03/20 0154    metoprolol (LOPRESSOR) injection 5 mg  5 mg Intravenous Q6H PRN Edward Vu MD   5 mg at 09/03/20 0815    cyclobenzaprine (FLEXERIL) tablet 5 mg  5 mg Oral TID Eliza Sr, DO   5 mg at 09/02/20 1917    HYDROcodone-acetaminophen (NORCO) 5-325 MG per tablet 1 tablet  1 tablet Oral Q4H PRN Mike Muhammads, DO   1 tablet at 09/02/20 0150    ondansetron (ZOFRAN-ODT) disintegrating tablet 4 mg  4 mg Oral Q6H PRN Mike Harpreet, DO        sodium chloride flush 0.9 % injection 10 mL  10 mL Intravenous 2 times per day Mike Mullins, DO   10 mL at 09/02/20 1918    sodium chloride flush 0.9 % injection 10 mL  10 mL Intravenous PRN Mike Mullins, DO   10 mL at 09/01/20 1245    acetaminophen (TYLENOL) tablet 650 mg  650 mg Oral Q4H PRN Mike Harpreet, DO         No Known Allergies  Active Problems:    SBO (small bowel obstruction) (HCC)    Hypernatremia    Acute pulmonary embolism ([address]  Resolved Problems:    * No resolved hospital problems.  *    Blood Dehydration/Hypernatremia/Hypokalemia  Hypocalcemia  MRDD/Autism Disorder  Tourettes Syndrome    ). Plan:   Transfer to ICU. Activity Plan: Bedrest.  Start/continue incentive spirometry. Consults: pulmonology, general surgery and . Advance diet as tolerated. Administer medications as ordered. (High Intensity Heparin, O2 via nasal canula  IV fluid bolus- central line to be placed, consent received from POA  IV Protonix for GI prophylaxis  IV Hydration  Advance Diet as tolerated  Continue Mendieta for bladder retention  Daily labs, monitor H/H, Electrolytes, acute drop in H/H -will repeat  No signs of GI bleed at this time per nursing staff, 2 regular BM today  Pulm on board, consult appreciated  Surgery on board, discharged from surgery standpoint  Bedrest  ).        Poornima Morocho MD  9/3/2020

## 2020-09-03 NOTE — CARE COORDINATION
Discharge planning    Chart reviewed. Patient transferred down from [facility] s/p respiratory distress and found to have bilateral PE . Patient currently on heparin gtt and will need oral anticoagulation prior to discharge. He has mcare and mcaid and may need PA with mcaid. He is on 3-4 liters of 02 and may need to have eval prior to discharge. Pulmonary documented borderline pulmonary htn which can be qualify patient. Per prior CM patient has history of MRDD ( moderate) and tourettes. He lives in own home with roommates and is his own guardian. He has CM Andres Otero  with [employer]. he has 24/7 care in the home. He goes to the drug store in [address]. Plan is for patient to return home, call [facility] for transport home. [phone]    GEN SURG  Status post robotic assisted laparoscopic inguinal hernia repair 8/31     Plan:  1. Diet: ok to advance diet as tolerated  2. Pain control: Norco and morphine, added Flexeril  3. Okay to be up out of bed and ambulate today. 4. Pulmonary embolism treatment per primary  5. Okay from a surgical standpoint for Mendieta removal.  6. Medical management per primary team.  Will follow along.  Ok to discharge from surgery standpoint medically stable    PULM    Assessment   · Acute hypoxic respiratory failure  · Bilateral pulmonary embolism  · Bilateral pleural effusion/mild pulmonary edema  · Small bowel obstruction secondary to recurrent right inguinal hernia, s/p robotic right inguinal hernia repair with mesh 8/31/20  · Borderline pulmonary hypertension, RVSP 37 mmHg 2019  Recommendations   · High intensity IV heparin  · Lower extremity Doppler  · 2D echo  · Moved to ICU  · We will consider diuresis, once tachycardia improves  · Oxygen via nasal cannula   · BiPAP if necessary

## 2020-09-03 NOTE — CONSULTS
IR note    Was consulted for catheter directed thrombolysis in a patient with b/l PE.  CTA chest from yesterday demonstrates that the emboli are small and are in segmental and further distal branches. No central or large emboli. Catheter directed thrombolysis is not indicated and the patient will unlikely benefit given the emboli are small and already distally located. Consider systemic tPA if not already administered. The above was relayed to Dr. Saskia Barraza.

## 2020-09-03 NOTE — PROGRESS NOTES
Maral Vogel   Urology Progress Note            Subjective: Follow-up urinary retention, indwelling Mendieta    Patient Vitals for the past 24 hrs:   BP Temp Temp src Pulse Resp SpO2   09/03/20 0600 115/81 -- -- 101 25 94 %   09/03/20 0500 117/81 -- -- 108 28 98 %   09/03/20 0400 107/83 98.2 °F (36.8 °C) Temporal 103 28 96 %   09/03/20 0300 112/82 -- -- 114 23 98 %   09/03/20 0200 117/87 -- -- 112 25 96 %   09/03/20 0100 113/84 -- -- 127 28 96 %   09/03/20 0000 103/74 98.6 °F (37 °C) Temporal 118 25 96 %   09/02/20 2300 103/78 -- -- 110 24 97 %   09/02/20 2200 108/73 -- -- 113 22 95 %   09/02/20 2100 120/79 -- -- 120 25 95 %   09/02/20 2000 127/84 97.6 °F (36.4 °C) Temporal 111 27 96 %   09/02/20 1929 -- -- -- -- (!) 33 95 %   09/02/20 1415 118/80 98.3 °F (36.8 °C) Axillary 125 (!) 31 95 %   09/02/20 1342 -- -- -- -- -- 95 %   09/02/20 1300 -- -- -- 128 (!) 32 --   09/02/20 1245 121/68 98.3 °F (36.8 °C) Axillary 131 (!) 31 92 %       Intake/Output Summary (Last 24 hours) at 9/3/2020 0719  Last data filed at 9/2/2020 2206  Gross per 24 hour   Intake --   Output 1725 ml   Net -1725 ml       Recent Labs     09/02/20  0556 09/02/20  1703 09/03/20  0411   WBC 11.2 13.5* 13.5*   HGB 14.0 15.1 12.7*   HCT 44.4 46.8 39.1*   MCV 88.1 86.7 86.1   * 98* 108*     Recent Labs     09/01/20  0509 09/02/20  0556 09/03/20  0411   * 144 139   K 4.1 4.0 3.9   * 109* 106   CO2 26 26 24   BUN 19 12 10   CREATININE 0.98 0.99 0.96       No results for input(s): COLORU, PHUR, LABCAST, WBCUA, RBCUA, MUCUS, TRICHOMONAS, YEAST, BACTERIA, CLARITYU, SPECGRAV, LEUKOCYTESUR, UROBILINOGEN, BILIRUBINUR, BLOODU in the last 72 hours.     Invalid input(s): NITRATE, GLUCOSEUKETONESUAMORPHOUS    Additional Lab/culture results:    Physical Exam: Patient transferred to ICU as noted above, he is progressing well from general surgery standpoint, passing flatus and had a bowel movement    With a recent onset pulmonary embolism I have suggested to keep the catheter indwelling    Interval Imaging Findings:    Impression:    Patient Active Problem List   Diagnosis    Tourette syndrome    MR (mental retardation), moderate    Impulse control disorder    Vitamin D deficiency    Partial symptomatic epilepsy with complex partial seizures, not intractable, without status epilepticus ([address]    Non-recurrent unilateral inguinal hernia    Tourette's disorder    Irreducible right inguinal hernia    Small bowel obstruction ([address]    Dehydration    Bandemia    Acute renal failure (HCC)    SBO (small bowel obstruction) ([address]    Hypernatremia       Plan: Maintain indwelling Mendieta  , Monitor for catheter associated UTI  Carlos Sexton  7:19 AM 9/3/2020

## 2020-09-03 NOTE — PROGRESS NOTES
Writer responded to code at [id number] which is when CPR was initiated. 1st dose of epi was pushed at 1458. Patient had IVF running wide open starting at 1459. 2nd dose of epi pushed at 1501. At 1503, patient was successfully intubated. At 1504, check pulse was done which confirmed PEA. At 1505, 1 full amp of bicarb was pushed  . At 1506, 3rd dose of epi pushed. At 1506, patient was also hooked up to suction. At 1507, TPA push was started. At 1509, 4th push of epi given. At 1510, pulse check completed with results of no pulse. At 1513, TPA push finished. At [id number], epi gtt started at 2 mcg/min. At 1515, pulse check completed with still no pulse. At 1517, 1 full amp of bicarb pushed. At 1518, pulse check completed with no pulse. At 1519, 5th dose of epi pushed. At 1523, faint pulse found and compressions were stopped. At 1525, patient had BP reading of 65/48. At 1526, patient had BP of 97/64 & then of 106/89. At 1530, epi gtt stopped. At 1531, levo gtt started. At 1532, eduar gtt started. Writer at this time left the room for pt chest x-ray.

## 2020-09-03 NOTE — PROGRESS NOTES
Addendum made 5:56 PM  Discussed with [facility] cardiology about CT scans. No obvious new pulmonary embolism with bibasilar infiltrate. Large amount of hemoperitoneum. Patient is on 3 vasopressors including Brendan-Synephrine, Levophed, vasopressin. He is getting 4 FFP's at this time. He will be receiving a total of 4 units of PRBC. After that we will check patient's lab to decide further about blood transfusion. Patient abdomen is distended and tense.   Patient remains very critical.  Electronically signed by     Kaila Sutherland MD on 9/3/2020 at 5:57 PM  Pulmonary Critical Care and Sleep Medicine,  [facility]  Cell: [phone]  Office: [phone]  Cc: 90 minutes

## 2020-09-03 NOTE — BRIEF OP NOTE
Brief Postoperative Note    Darek Negron  YOB: 1970  [id number]    Pre-operative Diagnosis: Hypotension    Post-operative Diagnosis: Same    Procedure: TLC placement    Anesthesia: Local    Surgeons/Assistants: Marty    Estimated Blood Loss: less than 50     Complications: None    Specimens: Was Not Obtained    Electronically signed by Travon Penn MD on 9/3/2020 at 3:37 PM

## 2020-09-03 NOTE — PROGRESS NOTES
1457 Pt found to be in PEA, code kristopher called, Skyler Doherty NP responded at 1500. See code note for events during code. 1700 Dr. Kassidy Irene to bedside. 1725 Pt transported to CT for stat chest/abd/pelvis CT, 1730 Dr. Gisella Chavez arrived to CT room.

## 2020-09-03 NOTE — FLOWSHEET NOTE
Writer to unit for [employer]. Staff at bedside. An employeee from the patient's group home arrives, and is updated by Evita Shaffer NP and Mer Cruz RN. Staff member informed of patient's critical status. Staff member states he will ask his supervisor to contact the patient's brother, and have the brother call the unit. Staff member leaves the hospital.    Andres Menendezs will inform next shift  of situation, and request follow up this evening.         09/03/20 1551   Encounter Summary   Services provided to: Patient not available   Referral/Consult From: Multi-disciplinary team   Continue Visiting   (9/3/20)   Complexity of Encounter Moderate   Length of Encounter 45 minutes   Routine   Type Follow up   Crisis   Type Code

## 2020-09-03 NOTE — SIGNIFICANT EVENT
[facility]  Office: [address], DO, Bandar Levine, DO, Ameena Fletcher, DO, Christianne Feliberto Bain, DO, Edward Sanders MD, Nichole Flynn MD, Guerry Hodgkins, MD, Adarsh Sam MD, Jeremiah Greene MD, Sea Curran MD, David Franz MD, oJselin Rocha MD, Agus Hewitt MD, Jenise Napoles DO, Amina Dean MD, Jerad Lees MD, Rea Waters DO, Loralyn Lennox, MD,  Carol Perrin DO, Thania Cross MD, Anjana Griffin MD, Abby Guajardo CNP, [facility], CNP, Franciso Osler, CNP, Claude Leech, CNS, Rae Manrique, CNP, Lauri Rdz, CNP, Nga Booker, CNP, Bruce Byrd, CNP, Scotty Najera, CNP, Charles Castillo PA-C, Jethro Goodwin, LOVE, Conception Pulley, CNP, Simran Santiago, CNP, Verna Barnes, CNP, Iliana Sosa, CNP, Annie Park, CNP         [place] [id number]      For more detailed information please refer to the progress note of the day      9/3/2020    time of incident is at approximately 1500 hrs. please see code report for all exact times. Name:   Anette Krabbe  MRN:     [id number]     [place]:      [de-identified]   Room:   [address] [place] [address] Day:  5  Admit Date:  8/29/2020 10:03 AM    PCP:   Poornima Morocho MD  Code Status:  Full Code      Pt vitals were reviewed   New labs were reviewed   Patient was seen      Karthik Chavez was called overhead and this provider did respond to the cardiac arrest.  On my arrival at the bedside patient is showing an organized rhythm of normal sinus rhythm with a rate of 90, however no palpable pulse is present and the patient does not have a blood pressure no urgency breathing. CPR is in progress and patient is already cyanotic however this rapidly resolves with aggressive CPR.      According to report from 1 of the bedside nurses the patient was alert and talking when she briefly stepped out of the room, for roughly less than 1 minute, and when she returned he was unresponsive and in cardiac arrest. According to the nurse report, and the brief timeframe between the time of the incident and my arrival, the patient appears to have gone from a stable heart rate to PEA very rapidly. Primary nurse arrived at the bedside at the same time as this provider. He informs this provider that the patient is here for status post bowel resection and has bilateral pulmonary emboli which he is receiving heparin for. Patient is quickly intubated successfully with a 7.0 ET tube and confirmation of placement is completed by RT. Patient received standard ACLS including IV epinephrine and highly effective chest compressions by the response team.  Excellent compression, ventilation, and ACLS compliance is demonstrated during this code by the response team.    Brief physical exam reveals that the patient's abdomen is mildly distended at this time. Primary nurse reports that this is slightly increased from this morning's exam, primary nurse also reports that the patient was being evaluated for possible intra-abdominal process and has an abdominal CT pending. Hemoglobin did drop from 15.1 to less than 12.7 overnight, however it has been somewhat labile since admission. As the patient had a stable heart rate without dramatic increase in tachycardia, the sudden onset of PEA, and the known pulmonary emboli it is decided very rapidly to treat the patient very aggressively as a PEA arrest secondary to occlusive pulmonary emboli. Differential also includes possible hypovolemic event from intra-abdominal hemorrhage. Presentation and symptoms with history and physical are more consistent with occlusive pulmonary emboli. Patient receives a TPA bolus of 50 mg and the heparin drip was maintained. Patient also received multiple rounds of epinephrine, and epinephrine drip, and multiple amps of bicarbonate. Less than 10 minutes after receiving the TPA bolus the patient did achieve return of spontaneous circulation.   Patient is initially hypotensive and tachycardic and is started on an epinephrine drip but is quickly transitioned to norepinephrine and Brendan-Synephrine. ABG is also drawn and patient was found to be profoundly acidotic. Patient received an additional amp of bicarbonate and started on a bicarb drip. Once return of spontaneous circulation is achieved, critical care is notified, as is the attending of record. Stat lab work is requested. We will defer any further treatment or evaluation to the patient's primary healthcare team.    Updated plan :     1. Defer further evaluation and treatment to the patient's primary medical team including surgery and critical care.         Daniel Bird, KELECHI - NP  9/3/2020  5:41 PM

## 2020-09-03 NOTE — PROGRESS NOTES
retention  · MICHAEL / hypernatremia   · MR, Tourettes syndrome, epilepsy    Recommendations:  · IV fluid bolus x1  · Start IV fluids at 100 mL's per hour for now  · Will start Brendan-Synephrine if needed  · Central line to monitor CVP  · High intensity IV heparin  · Lower extremity Doppler  · 2D echo  · We will consider diuresis, once tachycardia improves  · Oxygen via nasal cannula   · BiPAP if necessary   · Albuterol and Ipratropium Q 4 hours and prn  · Incentive spirometry every hour while awake  · X-ray chest in am  · Labs: CBC and BMP in am  · DVT prophylaxis, on high intensity heparin drip  · Discussed with RN  · Will follow with you    Jelly Landry MD, [facility]  Pulmonary Critical Care and Sleep Medicine,  [facility]  Cell: [phone]  Office: [phone]  Cc: 35 minutes

## 2020-09-03 NOTE — PROGRESS NOTES
Addendum:  Patient had PEA arrest. Patient was given TPA during the code. Now he is on Levophed and Brendan-Synephrine drip. He is severely acidotic. I gave him a total of 4 Amps of bicarb and started him on bicarb drip. He is repeat hemoglobin and hematocrit show severe anemia with hemoglobin being 5.2. CT scan of abdomen and pelvis which was ordered earlier, will change it to stat CT. also obtain CT of the chest to evaluate pulmonary embolism. Type and crossmatch and transfuse 4 units of PRBC. I will discontinue IV heparin at this time. And reverse heparin with protamine sulfate. Discussed with Dr. Ray Hobson. At this time patient is too critical for surgical intervention. I will add vasopressin at this time.   Electronically signed by     Klarissa Luna MD on 9/3/2020 at 4:58 PM  Pulmonary Critical Care and Sleep Medicine,  [facility]  Cell: [phone]  Office: [phone]

## 2020-09-04 NOTE — PROGRESS NOTES
Writer received call from Patient's brother, Weston Acostaece. Updated on patient's current condition and treatments. Reviewed code status options with Tyler Franco, would like patient to become [facility]. Tyler Franco states he is not ready to withdraw care yet, states he would like to [employer] his cousins and discuss this. \" Writer asked if patient had any other siblings, Tyler Franco states he is the only living sibling, both parents are , patient does not have children, and is not . Tyler Franco states he will call unit in morning after discussing situation with family.

## 2020-09-04 NOTE — PROGRESS NOTES
Writer attended patient during first 15 minutes of blood transfusion and did not recognize a potential blood reaction. Pt received one unit of pRBC.

## 2020-09-04 NOTE — PROGRESS NOTES
Writer attended patient during first 15 minutes of blood transfusion and did not recognize a potential blood reaction. Pt received first of two unit of pRBC.

## 2020-09-04 NOTE — PROGRESS NOTES
General Surgery:  Daily Progress Note                    PATIENT NAME: Jorge Grace     TODAY'S DATE: 9/4/2020, 5:58 AM    SUBJECTIVE:     Pt seen and examined at bedside. Status post PEA arrest and intubation. Maxed on 3 pressors. Red frothy fluid from mouth. No sedation. OBJECTIVE:   VITALS:  BP (!) 64/46   Pulse 114   Temp 98 °F (36.7 °C)   Resp (!) 0   Ht 5' 7\" (1.702 m)   Wt 180 lb (81.6 kg)   SpO2 (!) 38%   BMI 28.19 kg/m²      INTAKE/OUTPUT:      Intake/Output Summary (Last 24 hours) at 9/4/2020 0558  Last data filed at 9/4/2020 0556  Gross per 24 hour   Intake 5612.82 ml   Output --   Net 5612.82 ml       PHYSICAL EXAM:  General Appearance: Intubated, not responsive  HEENT:  Normocephalic  Heart: Tachycardic, regular rhythm  Lungs: Intubated, mechanical ventilation, red frothy fluid from the mouth  Abdomen: Tense, distended, incisions intact  Extremities: Ischemia noted to bilateral feet  Skin: Skin color, texture, turgor normal. No rashes or lesions.       Data:  CBC with Differential:    Lab Results   Component Value Date    WBC 5.3 09/04/2020    RBC 1.98 09/04/2020    HGB 5.7 09/04/2020    HCT 19.2 09/04/2020    PLT See Reflexed IPF Result 09/04/2020    MCV 97.0 09/04/2020    MCH 28.8 09/04/2020    MCHC 29.7 09/04/2020    RDW 14.7 09/04/2020    LYMPHOPCT PENDING 09/04/2020    LYMPHOPCT 22.7 11/16/2019    MONOPCT PENDING 09/04/2020    MONOPCT 7.4 11/16/2019    EOSPCT 0.9 11/16/2019    BASOPCT PENDING 09/04/2020    BASOPCT 0.7 11/16/2019    MONOSABS PENDING 09/04/2020    MONOSABS 0.6 11/16/2019    LYMPHSABS PENDING 09/04/2020    LYMPHSABS 1.8 11/16/2019    EOSABS PENDING 09/04/2020    EOSABS 0.1 11/16/2019    BASOSABS PENDING 09/04/2020    DIFFTYPE NOT REPORTED 09/04/2020     CMP:    Lab Results   Component Value Date     09/03/2020    K 4.1 09/03/2020     09/03/2020    CO2 22 09/03/2020    BUN 21 09/03/2020    CREATININE 2.11 09/03/2020    GFRAA 40 09/03/2020    LABGLOM 33

## 2020-09-04 NOTE — PROGRESS NOTES
I attended the patient during the first 15 minutes of the blood transfusion and did not recognize a potential blood reaction. Pt received second of two units of FFP.

## 2020-09-04 NOTE — DISCHARGE SUMMARY
Physician Discharge Summary     Patient ID:  Monica Damian  [id number]  27 y.o.  1970    Admit date: 2020    Discharge date and time: 2020 10:44 AM     Admitting Physician: Bev Vasques MD     Discharge Physician: Dr Francisco Wetzel    Admission Diagnoses: SBO (small bowel obstruction) ([facility] [address] [C75.844]    Discharge Diagnoses: B/L Pulmonary Embolism  Acute Hypoxic Respiratory Failure  PEA  Acute Intraperitoneal Bleed  Anemia requiring Transfusion  S/P Inguinal Hernia Repair  S/P SBO    Admission Condition: fair    Discharged Condition:     Indication for Admission: SBO    Hospital Course: Complicated. SBO was treated with reduction of recurrent Right Inguinal hernia, NPO, NGT suctioning as well as IV antibiotics, IV hydration, IV pain and antinausea meds and surgical repair of the recurrent hernia on 20. Patient developed acute SOB and was diagnosed as having B/L PE 2 days after surgery and was started on the high dose heparin protocol. He then developed acute drop in Hb and Low blood pressure the day after he was transfused PRBC and CT abdo showed bleeding into the abdo cavity. He developed PEA and was coded and intubated. TPA had to be given earlier for the PEA from the Pulm emboli he coded for a second time this am and was pronounced dead in the ICU. Consults: pulmonary/intensive care and general surgery    Significant Diagnostic Studies: labs, radiology: CXR: , nuclear medicine: CT chest , abdo and pelvis a    Treatments: IV heparin, IV PRBC, IV pain meds, IV Zofran, IV Fluids, IV K,Intubation after cardiac rescusitation    Discharge Exam:  No exam performed today, Patient .     Disposition: NA/ Home    In process/preliminary results:  Outstanding Order Results     Date and Time Order Name Status Description    9/3/2020 2015 PREPARE FRESH FROZEN PLASMA, 2 Units In process     9/3/2020 1719 CT ABDOMEN PELVIS WO CONTRAST Additional Contrast? None In process     9/3/2020 1557 TYPE

## 2020-09-04 NOTE — ED NOTES
I am called to patient's bedside to pronounce patient. Patient has been made DNR CCA by his power of . Patient is noted to have coded earlier with PEA arrest presenting rhythm at that time. Following this event he did experience DIC with significant bleeding complications. On evaluation earlier in the evening patient is noted to be on no sedating meds but remains completely unresponsive. Pupils are fixed and dilated at that time. Patient on monitor is noted to have entered rhythm of asystole and possible transient fine V. fib at 5:38 AM.  No compressions or defib activity indicated given DNR CCA designation and futile status. Patient time of death pronounced at 5:38 AM.  He is unresponsive. Pupils remain fixed and dilated. He has no spontaneous pulse. He has no spontaneous respiratory effort. ICU nursing staff to notify admitting physician, family and pastoral care.      Srini Gunn MD  09/04/20 2376

## 2020-09-04 NOTE — PROGRESS NOTES
Surgery Attending:  Arrived at bedside this am.  Made aware by nursing that patient had just passed. Events of yesterday and last evening discussed with Dr. Thong Morley at check out yesterday. Unfortunately, I think the patient developed DIC after his event yesterday and this was not a survivable event.

## 2020-09-04 NOTE — PROGRESS NOTES
Writer phones [email or web address] Primary phone to inform of body transfer to [address] [facility]. Writer speaks with Vishnu Umanzor who receives information regarding situation. Writer gives contact information for patient's brother and patient MRN to Fr. Selene Forbes for follow up.

## 2020-09-04 NOTE — PROGRESS NOTES
Call placed to [employer] to notify them of patient's declining condition, and updated them on patient's GCS of 3, as well as a brief history of patient's admission. Referral number [id number] was given to RN by [employer].

## 2020-09-04 NOTE — PROGRESS NOTES
Notified , Coleen Edmond, regarding patient. Patient is not a 's case okay to remove lines and release body.

## 2020-09-04 NOTE — PROGRESS NOTES
I attended the patient during the first 15 minutes of the blood transfusion and did not recognize a potential blood reaction. Pt received first of two units of FFP.

## 2020-09-04 NOTE — PROGRESS NOTES
Writer attended patient during first 15 minutes of blood transfusion and did not recognize a potential blood reaction. Pt received second of two units of pRBC.

## 2020-09-04 NOTE — PROGRESS NOTES
Received call back from [facility] with [employer], after reviewing labs they will not be following patient, he is not a candidate for donation.

## 2020-09-04 NOTE — PROGRESS NOTES
Tammyied Azalia from [employer], states they will call back within an hour to regarding if patient is a candidate.

## 2020-09-04 NOTE — PROGRESS NOTES
Received call back from Azalia with [employer], states patient is a candidate and they will continue to follow patient's case.

## 2020-09-04 NOTE — PROGRESS NOTES
I attended the patient during first 15 minutes of blood transfusion and did not recognize a potential blood reaction. Pt received first of two units of pRBC.

## 2020-09-04 NOTE — PROGRESS NOTES
Care taker, Ruy Cheema, at bedside to visit patient. States she tried calling brother, Hong Mays [phone], several times and he has not answered. Deins Garrison is aware Eric Key is in hospital and intubated. Ruy Cheema states Hong Mays is a , is out of town, and is unable to be in [place]. Per Mer Morales is patients only living relative. Writer attempted to call Hong Mays, no answer and voice mail box is full. Writer will attempt to call family again.

## 2020-09-05 LAB
ABO/RH: NORMAL
ANTIBODY SCREEN: NEGATIVE
ARM BAND NUMBER: NORMAL
BLD PROD TYP BPU: NORMAL
CROSSMATCH RESULT: NORMAL
DISPENSE STATUS BLOOD BANK: NORMAL
EXPIRATION DATE: NORMAL
TRANSFUSION STATUS: NORMAL
UNIT DIVISION: 0
UNIT NUMBER: NORMAL

## 2020-09-09 ENCOUNTER — TELEPHONE (OUTPATIENT)
Dept: FAMILY MEDICINE CLINIC | Age: 50
End: 2020-09-09

## 2020-09-09 NOTE — TELEPHONE ENCOUNTER
Pt's Brother called P 8.26.20 Alyse Polk ),  Requesting update about his brother from 8.26.20 to 9.4.20.      Under emergency contact info only, but not under Communication release of information since 11.14.18.

## (undated) DEVICE — TROCARS: Brand: KII® BALLOON BLUNT TIP SYSTEM

## (undated) DEVICE — CLIPVAC PRESURG HAIR REMOVAL

## (undated) DEVICE — BLADE CLIPPER GEN PURP NS

## (undated) DEVICE — GLOVE SURG SZ 8 CRM LTX FREE POLYISOPRENE POLYMER BEAD ANTI

## (undated) DEVICE — CANNULA SEAL

## (undated) DEVICE — TOWEL,OR,DSP,ST,BLUE,DLX,XR,4/PK,20PK/CS: Brand: MEDLINE

## (undated) DEVICE — APPLICATOR MEDICATED 26 CC SOLUTION HI LT ORNG CHLORAPREP

## (undated) DEVICE — BLADELESS OBTURATOR: Brand: WECK VISTA

## (undated) DEVICE — TIP COVER ACCESSORY

## (undated) DEVICE — GLOVE SURG SZ 75 CRM LTX FREE POLYISOPRENE POLYMER BEAD ANTI

## (undated) DEVICE — TUBING, SUCTION, 1/4" X 12', STRAIGHT: Brand: MEDLINE

## (undated) DEVICE — POSITIONER HD W8XH4XL8.5IN RASPBERRY FOAM SLT

## (undated) DEVICE — SUTURE V-LOC 90 3-0 L9IN ABSRB VLT L26MM V-20 1/2 CIR TAPR VLOCM0644

## (undated) DEVICE — SUTURE V-LOC 180 SZ 3-0 L6IN ABSRB GRN V-20 L26MM 1/2 CIR VLOCL0604

## (undated) DEVICE — ST. ANNE'S MULTI PORT PACK: Brand: MEDLINE INDUSTRIES, INC.

## (undated) DEVICE — GLOVE EXAM M L95IN FNGR THK35MIL PALM THK24MIL OFF WHT

## (undated) DEVICE — GLOVE SURG SZ 65 CRM LTX FREE POLYISOPRENE POLYMER BEAD ANTI

## (undated) DEVICE — BLANKET WRM W29.9XL79.1IN UP BODY FORC AIR MISTRAL-AIR

## (undated) DEVICE — GLOVE SURG SZ 75 L12IN FNGR THK79MIL GRN LTX FREE

## (undated) DEVICE — ARM DRAPE

## (undated) DEVICE — ELECTRO LUBE IS A SINGLE PATIENT USE DEVICE THAT IS INTENDED TO BE USED ON ELECTROSURGICAL ELECTRODES TO REDUCE STICKING.: Brand: KEY SURGICAL ELECTRO LUBE

## (undated) DEVICE — YANKAUER,FLEXIBLE HANDLE,REGLR CAPACITY: Brand: MEDLINE INDUSTRIES, INC.

## (undated) DEVICE — ADHESIVE SKIN CLSR 0.7ML TOP DERMBND ADV

## (undated) DEVICE — GLOVE SURG SZ 7 CRM LTX FREE POLYISOPRENE POLYMER BEAD ANTI